# Patient Record
Sex: MALE | Race: BLACK OR AFRICAN AMERICAN | NOT HISPANIC OR LATINO | Employment: FULL TIME | URBAN - METROPOLITAN AREA
[De-identification: names, ages, dates, MRNs, and addresses within clinical notes are randomized per-mention and may not be internally consistent; named-entity substitution may affect disease eponyms.]

---

## 2020-12-23 ENCOUNTER — OFFICE VISIT (OUTPATIENT)
Dept: FAMILY MEDICINE CLINIC | Facility: CLINIC | Age: 25
End: 2020-12-23

## 2020-12-23 VITALS
DIASTOLIC BLOOD PRESSURE: 68 MMHG | WEIGHT: 192 LBS | BODY MASS INDEX: 28.44 KG/M2 | SYSTOLIC BLOOD PRESSURE: 94 MMHG | TEMPERATURE: 97.8 F | HEIGHT: 69 IN | OXYGEN SATURATION: 98 % | HEART RATE: 64 BPM

## 2020-12-23 DIAGNOSIS — M25.472 BILATERAL SWELLING OF FEET AND ANKLES: ICD-10-CM

## 2020-12-23 DIAGNOSIS — M25.471 BILATERAL SWELLING OF FEET AND ANKLES: ICD-10-CM

## 2020-12-23 DIAGNOSIS — M25.474 BILATERAL SWELLING OF FEET AND ANKLES: ICD-10-CM

## 2020-12-23 DIAGNOSIS — M25.561 RIGHT KNEE PAIN, UNSPECIFIED CHRONICITY: ICD-10-CM

## 2020-12-23 DIAGNOSIS — Z76.89 ENCOUNTER TO ESTABLISH CARE: Primary | ICD-10-CM

## 2020-12-23 DIAGNOSIS — M25.475 BILATERAL SWELLING OF FEET AND ANKLES: ICD-10-CM

## 2020-12-23 LAB
ALBUMIN SERPL-MCNC: 4.9 G/DL (ref 3.5–5.2)
ALBUMIN/GLOB SERPL: 2 G/DL
ALP SERPL-CCNC: 91 U/L (ref 39–117)
ALT SERPL W P-5'-P-CCNC: 22 U/L (ref 1–41)
ANION GAP SERPL CALCULATED.3IONS-SCNC: 13.7 MMOL/L (ref 5–15)
AST SERPL-CCNC: 28 U/L (ref 1–40)
BILIRUB SERPL-MCNC: 0.5 MG/DL (ref 0–1.2)
BUN SERPL-MCNC: 11 MG/DL (ref 6–20)
BUN/CREAT SERPL: 11.1 (ref 7–25)
CALCIUM SPEC-SCNC: 9.9 MG/DL (ref 8.6–10.5)
CHLORIDE SERPL-SCNC: 100 MMOL/L (ref 98–107)
CO2 SERPL-SCNC: 26.3 MMOL/L (ref 22–29)
CREAT SERPL-MCNC: 0.99 MG/DL (ref 0.76–1.27)
ERYTHROCYTE [DISTWIDTH] IN BLOOD BY AUTOMATED COUNT: 14.6 % (ref 12.3–15.4)
GFR SERPL CREATININE-BSD FRML MDRD: 111 ML/MIN/1.73
GLOBULIN UR ELPH-MCNC: 2.5 GM/DL
GLUCOSE SERPL-MCNC: 94 MG/DL (ref 65–99)
HCT VFR BLD AUTO: 46 % (ref 37.5–51)
HGB BLD-MCNC: 14.2 G/DL (ref 13–17.7)
LYMPHOCYTES # BLD AUTO: 2.6 10*3/MM3 (ref 0.7–3.1)
LYMPHOCYTES NFR BLD AUTO: 44.3 % (ref 19.6–45.3)
MCH RBC QN AUTO: 23.4 PG (ref 26.6–33)
MCHC RBC AUTO-ENTMCNC: 30.9 G/DL (ref 31.5–35.7)
MCV RBC AUTO: 75.6 FL (ref 79–97)
MONOCYTES # BLD AUTO: 0.4 10*3/MM3 (ref 0.1–0.9)
MONOCYTES NFR BLD AUTO: 7 % (ref 5–12)
NEUTROPHILS NFR BLD AUTO: 2.9 10*3/MM3 (ref 1.7–7)
NEUTROPHILS NFR BLD AUTO: 48.7 % (ref 42.7–76)
PLATELET # BLD AUTO: 197 10*3/MM3 (ref 140–450)
PMV BLD AUTO: 8.5 FL (ref 6–12)
POTASSIUM SERPL-SCNC: 3.8 MMOL/L (ref 3.5–5.2)
PROT SERPL-MCNC: 7.4 G/DL (ref 6–8.5)
RBC # BLD AUTO: 6.08 10*6/MM3 (ref 4.14–5.8)
SODIUM SERPL-SCNC: 140 MMOL/L (ref 136–145)
TSH SERPL DL<=0.05 MIU/L-ACNC: 5.26 UIU/ML (ref 0.27–4.2)
WBC # BLD AUTO: 5.9 10*3/MM3 (ref 3.4–10.8)

## 2020-12-23 PROCEDURE — 36415 COLL VENOUS BLD VENIPUNCTURE: CPT | Performed by: NURSE PRACTITIONER

## 2020-12-23 PROCEDURE — 85025 COMPLETE CBC W/AUTO DIFF WBC: CPT | Performed by: NURSE PRACTITIONER

## 2020-12-23 PROCEDURE — 99203 OFFICE O/P NEW LOW 30 MIN: CPT | Performed by: NURSE PRACTITIONER

## 2020-12-23 PROCEDURE — 80053 COMPREHEN METABOLIC PANEL: CPT | Performed by: NURSE PRACTITIONER

## 2020-12-23 PROCEDURE — 84443 ASSAY THYROID STIM HORMONE: CPT | Performed by: NURSE PRACTITIONER

## 2020-12-23 NOTE — PROGRESS NOTES
Renetta Coronado is a 25 y.o. male.     History of Present Illness   Here today to establish care, no recent pcp, he works at [employer], he is single, no children, does stay active, states diet is good. Denies smoking. He does speak english and Canadian, we used a  via phonein office and via amanda during the visit today.   C/o right knee pain, he states symptoms started about 2-3 months ago, he states worsening with new work, he states foot swells at times, mostly with walking or standing more, occasionally in the left foot. Denies knee swelling, but does notice in bilat ankles and feet. States he thinks symptoms started about 2 years ago but worse now that he is standing all day at work. He does have ortho appt in January.       The following portions of the patient's history were reviewed and updated as appropriate: allergies, current medications, past family history, past medical history, past social history, past surgical history and problem list.    Review of Systems   Constitutional: Negative for chills, diaphoresis and fever.   Respiratory: Negative for cough and shortness of breath.    Cardiovascular: Negative for chest pain.   Musculoskeletal: Negative for arthralgias and myalgias.   Neurological: Negative for dizziness, light-headedness and headache.   All other systems reviewed and are negative.      Objective   Physical Exam  Vitals signs and nursing note reviewed.   Constitutional:       Appearance: He is well-developed.   HENT:      Head: Normocephalic.   Eyes:      Pupils: Pupils are equal, round, and reactive to light.   Cardiovascular:      Rate and Rhythm: Normal rate and regular rhythm.      Pulses:           Radial pulses are 2+ on the right side and 2+ on the left side.        Dorsalis pedis pulses are 2+ on the right side and 2+ on the left side.        Posterior tibial pulses are 2+ on the right side and 2+ on the left side.      Heart sounds: Normal heart sounds.       Comments: Mild bilat ankle swelling, non pitting R>L  Pulmonary:      Effort: Pulmonary effort is normal.      Breath sounds: Normal breath sounds.   Musculoskeletal:      Right knee: He exhibits normal range of motion and no swelling. No tenderness found.   Skin:     General: Skin is warm and dry.   Neurological:      Mental Status: He is alert and oriented to person, place, and time.   Psychiatric:         Behavior: Behavior normal.         Judgment: Judgment normal.           Assessment/Plan   Diagnoses and all orders for this visit:    1. Encounter to establish care (Primary)  -     Comprehensive Metabolic Panel  -     CBC & Differential  -     TSH  -     CBC Auto Differential    2. Right knee pain, unspecified chronicity  -     Comprehensive Metabolic Panel  -     CBC & Differential  -     TSH  -     CBC Auto Differential    3. Bilateral swelling of feet and ankles  -     Comprehensive Metabolic Panel  -     CBC & Differential  -     TSH  -     CBC Auto Differential      Labs today will call with results.   Encouraged rest, ice, elevation, compression stockings, good shoes, may try knee brace OTC.   If symptoms persist call office.   Cont f/u with ortho as scheduled.   Low salt diet.   Patient agrees with plan of care and understands instructions. Call if worsening symptoms or any problems or concerns.

## 2020-12-23 NOTE — PATIENT INSTRUCTIONS
Labs today will call with results.   Encouraged rest, ice, elevation, compression stockings, good shoes, may try knee brace OTC.   If symptoms persist call office.   Low salt diet.   Patient agrees with plan of care and understands instructions. Call if worsening symptoms or any problems or concerns.

## 2020-12-24 DIAGNOSIS — R79.89 ELEVATED TSH: Primary | ICD-10-CM

## 2021-01-15 ENCOUNTER — OFFICE VISIT (OUTPATIENT)
Dept: ORTHOPEDIC SURGERY | Facility: CLINIC | Age: 26
End: 2021-01-15

## 2021-01-15 VITALS — TEMPERATURE: 97.2 F | WEIGHT: 192 LBS | BODY MASS INDEX: 28.44 KG/M2 | HEIGHT: 69 IN

## 2021-01-15 DIAGNOSIS — G89.29 CHRONIC PAIN OF RIGHT KNEE: Primary | ICD-10-CM

## 2021-01-15 DIAGNOSIS — M25.561 CHRONIC PAIN OF RIGHT KNEE: Primary | ICD-10-CM

## 2021-01-15 PROCEDURE — 99203 OFFICE O/P NEW LOW 30 MIN: CPT | Performed by: ORTHOPAEDIC SURGERY

## 2021-01-18 NOTE — PROGRESS NOTES
Patient: Gema Coronado    YOB: 1995    Medical Record Number: [id number]    Chief Complaints: Right knee and leg pain    History of Present Illness:     26 y.o. male patient who presents for his right knee.  He does not speak English.  A Irish  has accompanied him today.  He reports a roughly 6-week history of worsening anterior knee pain.  Pain is described as moderate, constant and burning.  Pain is roughly 5 out of 10 at present.  He reports associated swelling and stiffness.  Symptoms are worse with standing, walking, sitting and climbing stairs.  He has tried physical therapy but it has not helped.  He reports that his symptoms are getting worse.  He tells me it periodically feels like the knee locks up.  He does report catching, clicking and popping as well.    Allergies: No Known Allergies    Home Medications:    No current outpatient medications on file.    History reviewed. No pertinent past medical history.    History reviewed. No pertinent surgical history.    Social History     Occupational History   • Not on file   Tobacco Use   • Smoking status: Never Smoker   Substance and Sexual Activity   • Alcohol use: Yes     Comment: occasional   • Drug use: Defer   • Sexual activity: Not on file      Social History     Social History Narrative   • Not on file       Family History   Problem Relation Age of Onset   • No Known Problems Mother    • No Known Problems Father        Review of Systems:      Constitutional: Denies fever, shaking or chills   Eyes: Denies change in visual acuity   HEENT: Denies nasal congestion or sore throat   Respiratory: Denies cough or shortness of breath   Cardiovascular: Denies chest pain or edema  Endocrine: Denies tremors, palpitations, intolerance of heat or cold, polyuria, polydipsia.  GI: Denies abdominal pain, nausea, vomiting, bloody stools or diarrhea  : Denies frequency, urgency, incontinence, retention, or nocturia.  Musculoskeletal:  "Denies numbness, tingling or loss of motor function except as above  Integument: Denies rash, lesion or ulceration   Neurologic: Denies headache or focal weakness, deficits  Heme: Denies spontaneous or excessive bleeding, epistaxis, hematuria, melena, fatigue, enlarged or tender lymph nodes.      All other pertinent positives and negatives as noted above in HPI.    Physical Exam: 26 y.o. male    Vitals:    01/15/21 1429   Temp: 97.2 °F (36.2 °C)   Weight: 87.1 kg (192 lb)   Height: 175.3 cm (69\")     General:  Patient is awake and alert.  Appears in no acute distress or discomfort.    Psych:  Affect and demeanor are appropriate.    Eyes:  Conjunctiva and sclera appear grossly normal.  Eyes track well and EOM seem to be intact.    Ears:  No gross abnormalities.  Hearing adequate for the exam.    Cardiovascular:  Regular rate and rhythm.    Lungs:  Good chest expansion.  Breathing unlabored.    Lymph:  No palpable masses or adenopathy in the affected extremity    Extremities: Right knee and leg are examined.  Skin is benign.  He has some edema in his right ankle which is 1+ and pitting.  He has similar edema in the other leg.  He has anterior tenderness over the lateral patellofemoral retinaculum.  No crepitus.  No effusion.  He has good knee motion.  No instability.  Negative medial and lateral Antoni's test.  Normal motor and sensory function throughout the leg and foot.  Palpable pedal pulse.  Brisk capillary refill.         Radiology:   Outside x-rays including AP, 45 degree PA flexion and lateral views of the right knee are reviewed.  No comparison films are available.  No abnormalities noted on the x-rays.    Assessment/Plan: 1.  Bilateral pedal edema   2.  Persistent anterior right knee pain of unclear etiology    He has pedal edema bilaterally.  I told him this is unusual for such a young person in my experience.  I consider this is most likely a medical issue for which I recommend he talk with his PCP.  He " did have lab work recently which showed an elevated TSH.  I suggested he talk with his PCP about whether or not this could be related.  If they do not think this is a medical issue, then I recommend we get him to vascular to have this evaluated.  Regardless, I do not think it is related to his anterior knee pain.  He is going to reach back out to his PCP about this.      His knee exam is fairly benign.  I do not have a good explanation for him.  We discussed that and he would like to pursue further work-up.  I have agreed to refer him for an MRI.  I told him I will call him with the results.    Patrick Brock MD    01/15/2021    CC to Jacinda Bellamy, MARCY

## 2021-02-01 ENCOUNTER — OFFICE VISIT (OUTPATIENT)
Dept: FAMILY MEDICINE CLINIC | Facility: CLINIC | Age: 26
End: 2021-02-01

## 2021-02-01 VITALS
SYSTOLIC BLOOD PRESSURE: 102 MMHG | WEIGHT: 191 LBS | HEIGHT: 69 IN | BODY MASS INDEX: 28.29 KG/M2 | RESPIRATION RATE: 20 BRPM | TEMPERATURE: 96.8 F | HEART RATE: 88 BPM | DIASTOLIC BLOOD PRESSURE: 64 MMHG | OXYGEN SATURATION: 97 %

## 2021-02-01 DIAGNOSIS — M25.475 BILATERAL SWELLING OF FEET AND ANKLES: ICD-10-CM

## 2021-02-01 DIAGNOSIS — R79.89 ELEVATED TSH: ICD-10-CM

## 2021-02-01 DIAGNOSIS — M25.474 BILATERAL SWELLING OF FEET AND ANKLES: ICD-10-CM

## 2021-02-01 DIAGNOSIS — M25.561 RIGHT KNEE PAIN, UNSPECIFIED CHRONICITY: Primary | ICD-10-CM

## 2021-02-01 DIAGNOSIS — R79.89 TSH ELEVATION: ICD-10-CM

## 2021-02-01 DIAGNOSIS — J30.9 ALLERGIC RHINITIS, UNSPECIFIED SEASONALITY, UNSPECIFIED TRIGGER: ICD-10-CM

## 2021-02-01 DIAGNOSIS — M25.472 BILATERAL SWELLING OF FEET AND ANKLES: ICD-10-CM

## 2021-02-01 DIAGNOSIS — M25.471 BILATERAL SWELLING OF FEET AND ANKLES: ICD-10-CM

## 2021-02-01 LAB
T-UPTAKE NFR SERPL: 1.09 TBI (ref 0.8–1.3)
T4 SERPL-MCNC: 5.82 MCG/DL (ref 4.5–11.7)
TSH SERPL DL<=0.05 MIU/L-ACNC: 2.09 UIU/ML (ref 0.27–4.2)

## 2021-02-01 PROCEDURE — 84479 ASSAY OF THYROID (T3 OR T4): CPT | Performed by: NURSE PRACTITIONER

## 2021-02-01 PROCEDURE — 84436 ASSAY OF TOTAL THYROXINE: CPT | Performed by: NURSE PRACTITIONER

## 2021-02-01 PROCEDURE — 84443 ASSAY THYROID STIM HORMONE: CPT | Performed by: NURSE PRACTITIONER

## 2021-02-01 PROCEDURE — 36415 COLL VENOUS BLD VENIPUNCTURE: CPT | Performed by: NURSE PRACTITIONER

## 2021-02-01 PROCEDURE — 99214 OFFICE O/P EST MOD 30 MIN: CPT | Performed by: NURSE PRACTITIONER

## 2021-02-01 RX ORDER — FLUTICASONE PROPIONATE 50 MCG
2 SPRAY, SUSPENSION (ML) NASAL DAILY
Qty: 18 G | Refills: 3 | Status: SHIPPED | OUTPATIENT
Start: 2021-02-01 | End: 2022-12-09

## 2021-02-01 NOTE — PATIENT INSTRUCTIONS
Cont f/u with ortho and MRI as scheduled.   Refer to vascular will call with appt.   Recheck thyroid lab today, will call with results.   Cont compression, low salt diet, leg elevation.   Patient agrees with plan of care and understands instructions. Call if worsening symptoms or any problems or concerns.

## 2021-02-01 NOTE — PROGRESS NOTES
"Chief Complaint  lab resulta (discuss lab results) and pain right knee (no known injury)    Renetta Currie presents to [facility] for   History of Present Illness  Here today for f/u, c/o right knee pain, he was seen in office 12/23/2020, referred to ortho and saw ortho for knee pain 1/15/2021. He has knee MRI scheduled for 2/11/2021, with bilat ankle swelling, ortho recommended vascular consult if no medical reason for this, states knee pain has improved worse if he works longer, states PT did help. He does wear compression stockings. States swelling has not improved. BP WNL.   Last labs 12/23/2020 tsh elevated due for recheck today.   He also c/o PND, states noticed about 2 years ago, he has drainage, coughs up sputum at times.       Objective   Vital Signs:   /64 (BP Location: Left arm, Patient Position: Sitting)   Pulse 88   Temp 96.8 °F (36 °C) (Infrared)   Resp 20   Ht 175.3 cm (69\")   Wt 86.6 kg (191 lb)   SpO2 97%   BMI 28.21 kg/m²     Physical Exam  Vitals signs and nursing note reviewed.   Constitutional:       Appearance: He is well-developed.   HENT:      Head: Normocephalic.   Eyes:      Pupils: Pupils are equal, round, and reactive to light.   Neck:      Musculoskeletal: Neck supple.      Thyroid: No thyromegaly.   Cardiovascular:      Rate and Rhythm: Normal rate and regular rhythm.      Heart sounds: Normal heart sounds.   Pulmonary:      Effort: Pulmonary effort is normal.      Breath sounds: Normal breath sounds.   Feet:      Comments: Mild bilat non pitting edema ankles and feet.   Skin:     General: Skin is warm and dry.   Neurological:      Mental Status: He is alert and oriented to person, place, and time.   Psychiatric:         Behavior: Behavior normal.         Judgment: Judgment normal.        Result Review :                 Assessment and Plan    Problem List Items Addressed This Visit     None      Visit " Diagnoses     Right knee pain, unspecified chronicity    -  Primary    TSH elevation        Bilateral swelling of feet and ankles        Relevant Orders    Ambulatory Referral to Vascular Surgery    Allergic rhinitis, unspecified seasonality, unspecified trigger        Elevated TSH              Follow Up   Return if symptoms worsen or fail to improve.  Patient was given instructions and counseling regarding his condition or for health maintenance advice. Please see specific information pulled into the AVS if appropriate.     Cont f/u with ortho and MRI as scheduled.   Refer to vascular will call with appt.   Recheck thyroid lab today, will call with results.   Cont compression, low salt diet, leg elevation.   Trial flonase 2 sprays each nostril daily as needed.   If symptoms persist call office.   Patient agrees with plan of care and understand instructions. Call if worsening symptoms or any problems or concerns.

## 2021-02-11 ENCOUNTER — HOSPITAL ENCOUNTER (OUTPATIENT)
Dept: MRI IMAGING | Facility: HOSPITAL | Age: 26
Discharge: HOME | End: 2021-02-11
Admitting: ORTHOPAEDIC SURGERY

## 2021-02-11 DIAGNOSIS — G89.29 CHRONIC PAIN OF RIGHT KNEE: ICD-10-CM

## 2021-02-11 DIAGNOSIS — M25.561 CHRONIC PAIN OF RIGHT KNEE: ICD-10-CM

## 2021-02-11 PROCEDURE — 73721 MRI JNT OF LWR EXTRE W/O DYE: CPT

## 2021-04-16 ENCOUNTER — BULK ORDERING (OUTPATIENT)
Dept: CASE MANAGEMENT | Facility: OTHER | Age: 26
End: 2021-04-16

## 2021-04-16 DIAGNOSIS — Z23 IMMUNIZATION DUE: ICD-10-CM

## 2021-09-16 ENCOUNTER — PATIENT MESSAGE (OUTPATIENT)
Dept: FAMILY MEDICINE CLINIC | Facility: CLINIC | Age: 26
End: 2021-09-16

## 2021-09-16 ENCOUNTER — LAB (OUTPATIENT)
Dept: FAMILY MEDICINE CLINIC | Facility: CLINIC | Age: 26
End: 2021-09-16

## 2021-09-16 ENCOUNTER — TELEPHONE (OUTPATIENT)
Dept: FAMILY MEDICINE CLINIC | Facility: CLINIC | Age: 26
End: 2021-09-16

## 2021-09-16 DIAGNOSIS — Z20.822 EXPOSURE TO COVID-19 VIRUS: ICD-10-CM

## 2021-09-16 DIAGNOSIS — R51.9 NONINTRACTABLE HEADACHE, UNSPECIFIED CHRONICITY PATTERN, UNSPECIFIED HEADACHE TYPE: ICD-10-CM

## 2021-09-16 DIAGNOSIS — Z20.822 EXPOSURE TO COVID-19 VIRUS: Primary | ICD-10-CM

## 2021-09-16 DIAGNOSIS — J02.9 SORE THROAT: ICD-10-CM

## 2021-09-16 NOTE — TELEPHONE ENCOUNTER
Mychart, Generic 9/16/2021 2:30 PM EDT    Yesterday   ----- Message -----  From: MALINDA DAILEY  Sent: 9/16/21, 2:13 PM  To: Gema Currie  Subject: RE: Test Results Question    What is the first day you have missed for your note?      ----- Message -----   From:Gema Currie   Sent:9/16/2021 2:05 PM EDT   To:MARCY Valdez   Subject:RE: Test Results Question    I called your office's number for coming at 2:30, she told me that you will call me back       ----- Message -----   From:MARCY Smart   Sent:9/16/2021 1:27 PM EDT   To:Gema Currie   Subject:RE: Test Results Question    You can come by the clinic and wait in the parking lot and we can come down and swab you. You should not have to pay, please call and let us know when you will be here before 4 PM      ----- Message -----   From:Gema Currie   Sent:9/16/2021 1:02 PM EDT   To:MARCY Valdez   Subject:RE: Test Results Question    I was with my brother and sister on Saturday and it was on the evening that they tested positive, which is why I too would like to do the test if possible. Is the test paid for or is it free? If it pays, how much is it? And when can I do it ?      ----- Message -----   From:MARCY Smart   Sent:9/16/2021 12:57 PM EDT   To:Gema Currie   Subject:RE: Test Results Question    What day were you exposed? We can test you in the parking lot if you would like      ----- Message -----   From:Gema Currie   Sent:9/16/2021 12:35 AM EDT   To:MARCY Valdez   Subject:Test Results Question    Good evening Doctor,  I am fully vaccinated but I have been exposed to people who have tested positive and I feel very tired and some trouble with my throat, so I do not know if these could be symptoms that is why I am contacting you and if possible to take my test

## 2021-09-16 NOTE — TELEPHONE ENCOUNTER
UNABLE TO WARM TRANSFER    Caller: Gema Currie    Relationship to patient: Self    Best call back number: [phone]    Patient is needing: PATIENT IS NEEDING TO SCHEDULE A COVID TEST. PLEASE CALL. HE WILL NEED A WORK NOTE ALSO.

## 2021-09-17 LAB
LABCORP SARS-COV-2, NAA 2 DAY TAT: NORMAL
SARS-COV-2 RNA RESP QL NAA+PROBE: NOT DETECTED

## 2022-12-09 ENCOUNTER — OFFICE VISIT (OUTPATIENT)
Dept: FAMILY MEDICINE CLINIC | Facility: CLINIC | Age: 27
End: 2022-12-09

## 2022-12-09 VITALS
OXYGEN SATURATION: 98 % | BODY MASS INDEX: 30.72 KG/M2 | DIASTOLIC BLOOD PRESSURE: 72 MMHG | SYSTOLIC BLOOD PRESSURE: 114 MMHG | HEART RATE: 73 BPM | HEIGHT: 69 IN | WEIGHT: 207.4 LBS | TEMPERATURE: 98.4 F

## 2022-12-09 DIAGNOSIS — Z76.89 ENCOUNTER TO ESTABLISH CARE WITH NEW DOCTOR: ICD-10-CM

## 2022-12-09 DIAGNOSIS — Z00.00 ENCOUNTER FOR ANNUAL PHYSICAL EXAM: Primary | ICD-10-CM

## 2022-12-09 DIAGNOSIS — R60.9 TRACE EDEMA: ICD-10-CM

## 2022-12-09 PROCEDURE — 99395 PREV VISIT EST AGE 18-39: CPT | Performed by: NURSE PRACTITIONER

## 2022-12-09 NOTE — PATIENT INSTRUCTIONS
Recommend limiting salt in diet, use of compression socks during day and get a good fitting shoe. Will notify of lab results and when to follow up.

## 2022-12-09 NOTE — PROGRESS NOTES
"Chief Complaint  Establish Care (Pt not fasting) and Foot Swelling (bilateral)    Subjective    {Problem List  Visit Diagnosis   Encounters  Notes  Medications  Labs  Result Review Imaging  Media :23}    Gema Currie presents to [facility]  History of Present Illness   27-year-old male former Jacinda Bellamy patient, new to me, presenting to establish [facility] and annual exam, fasting today for labs, single, lives alone, does not smoke, exercises occasionally.  Denies any known medical history, no current medication regimen.  He does report intermittent edema bilateral feet, denies CP, heart palpitations, SOA, blood pressure well controlled without medication at 114/72, denies family history of heart disease, he does appear to have on tight fitting shoes, recommended proper footwear, limit salt intake and follow-up if symptoms not improved.    Objective   Vital Signs:  /72 (BP Location: Left arm, Patient Position: Sitting, Cuff Size: Large Adult)   Pulse 73   Temp 98.4 °F (36.9 °C) (Infrared)   Ht 175.3 cm (69\")   Wt 94.1 kg (207 lb 6.4 oz)   SpO2 98%   BMI 30.63 kg/m²   Estimated body mass index is 30.63 kg/m² as calculated from the following:    Height as of this encounter: 175.3 cm (69\").    Weight as of this encounter: 94.1 kg (207 lb 6.4 oz).    BMI is >= 30 and <35. (Class 1 Obesity). The following options were offered after discussion;: weight loss educational material (shared in after visit summary)      Physical Exam  HENT:      Head: Normocephalic.   Eyes:      Pupils: Pupils are equal, round, and reactive to light.   Cardiovascular:      Rate and Rhythm: Normal rate.      Pulses: Normal pulses.      Heart sounds: Normal heart sounds.      Comments: Trace edema bilateral feet  Pulmonary:      Effort: Pulmonary effort is normal.      Breath sounds: Normal breath sounds.   Abdominal:      General: Bowel sounds are normal.      Palpations: Abdomen is soft. "   Musculoskeletal:         General: Normal range of motion.      Cervical back: Normal range of motion.   Skin:     General: Skin is warm.   Neurological:      General: No focal deficit present.      Mental Status: He is alert and oriented to person, place, and time.   Psychiatric:         Mood and Affect: Mood normal.         Behavior: Behavior normal.         Thought Content: Thought content normal.         Judgment: Judgment normal.        Result Review :                Assessment and Plan   Diagnoses and all orders for this visit:    1. Encounter for annual physical exam (Primary)  -     CBC & Differential  -     Comprehensive Metabolic Panel  -     Lipid Panel  -     TSH    2. Encounter to establish care with new doctor    3. Trace edema  Comments:  Limit salt intake, recommended proper footwear, follow-up if symptoms not improved.      Counseling was provided on nutrition, physical activity, injury prevention, eye and dental health, patient verbalizes understanding no additional questions were asked.       Follow Up   Return if symptoms worsen or fail to improve.  Patient was given instructions and counseling regarding his condition or for health maintenance advice. Please see specific information pulled into the AVS if appropriate.     Recommend limiting salt in diet, use of compression socks during day and get a good fitting shoe. Will notify of lab results and when to follow up.     Mask and gloves worn

## 2022-12-10 LAB
ALBUMIN SERPL-MCNC: 4.7 G/DL (ref 4.1–5.2)
ALBUMIN/GLOB SERPL: 1.9 {RATIO} (ref 1.2–2.2)
ALP SERPL-CCNC: 97 IU/L (ref 44–121)
ALT SERPL-CCNC: 22 IU/L (ref 0–44)
AST SERPL-CCNC: 27 IU/L (ref 0–40)
BASOPHILS # BLD AUTO: 0 X10E3/UL (ref 0–0.2)
BASOPHILS NFR BLD AUTO: 1 %
BILIRUB SERPL-MCNC: 0.2 MG/DL (ref 0–1.2)
BUN SERPL-MCNC: 7 MG/DL (ref 6–20)
BUN/CREAT SERPL: 7 (ref 9–20)
CALCIUM SERPL-MCNC: 9.6 MG/DL (ref 8.7–10.2)
CHLORIDE SERPL-SCNC: 103 MMOL/L (ref 96–106)
CHOLEST SERPL-MCNC: 188 MG/DL (ref 100–199)
CO2 SERPL-SCNC: 24 MMOL/L (ref 20–29)
CREAT SERPL-MCNC: 1.04 MG/DL (ref 0.76–1.27)
EGFRCR SERPLBLD CKD-EPI 2021: 100 ML/MIN/1.73
EOSINOPHIL # BLD AUTO: 0.1 X10E3/UL (ref 0–0.4)
EOSINOPHIL NFR BLD AUTO: 2 %
ERYTHROCYTE [DISTWIDTH] IN BLOOD BY AUTOMATED COUNT: 13.8 % (ref 11.6–15.4)
GLOBULIN SER CALC-MCNC: 2.5 G/DL (ref 1.5–4.5)
GLUCOSE SERPL-MCNC: 99 MG/DL (ref 70–99)
HCT VFR BLD AUTO: 46.4 % (ref 37.5–51)
HDLC SERPL-MCNC: 33 MG/DL
HGB BLD-MCNC: 14.3 G/DL (ref 13–17.7)
IMM GRANULOCYTES # BLD AUTO: 0 X10E3/UL (ref 0–0.1)
IMM GRANULOCYTES NFR BLD AUTO: 0 %
LDLC SERPL CALC-MCNC: 135 MG/DL (ref 0–99)
LYMPHOCYTES # BLD AUTO: 1.7 X10E3/UL (ref 0.7–3.1)
LYMPHOCYTES NFR BLD AUTO: 33 %
MCH RBC QN AUTO: 23.6 PG (ref 26.6–33)
MCHC RBC AUTO-ENTMCNC: 30.8 G/DL (ref 31.5–35.7)
MCV RBC AUTO: 76 FL (ref 79–97)
MONOCYTES # BLD AUTO: 0.6 X10E3/UL (ref 0.1–0.9)
MONOCYTES NFR BLD AUTO: 11 %
NEUTROPHILS # BLD AUTO: 2.7 X10E3/UL (ref 1.4–7)
NEUTROPHILS NFR BLD AUTO: 53 %
PLATELET # BLD AUTO: 202 X10E3/UL (ref 150–450)
POTASSIUM SERPL-SCNC: 4.4 MMOL/L (ref 3.5–5.2)
PROT SERPL-MCNC: 7.2 G/DL (ref 6–8.5)
RBC # BLD AUTO: 6.07 X10E6/UL (ref 4.14–5.8)
SODIUM SERPL-SCNC: 142 MMOL/L (ref 134–144)
TRIGL SERPL-MCNC: 107 MG/DL (ref 0–149)
TSH SERPL DL<=0.005 MIU/L-ACNC: 2.66 UIU/ML (ref 0.45–4.5)
VLDLC SERPL CALC-MCNC: 20 MG/DL (ref 5–40)
WBC # BLD AUTO: 5.1 X10E3/UL (ref 3.4–10.8)

## 2022-12-15 DIAGNOSIS — Z13.0 SCREENING FOR IRON DEFICIENCY ANEMIA: Primary | ICD-10-CM

## 2023-01-27 ENCOUNTER — OFFICE VISIT (OUTPATIENT)
Dept: FAMILY MEDICINE CLINIC | Facility: CLINIC | Age: 28
End: 2023-01-27
Payer: COMMERCIAL

## 2023-01-27 VITALS
BODY MASS INDEX: 30.42 KG/M2 | SYSTOLIC BLOOD PRESSURE: 132 MMHG | TEMPERATURE: 97.3 F | HEART RATE: 86 BPM | WEIGHT: 205.4 LBS | OXYGEN SATURATION: 97 % | HEIGHT: 69 IN | DIASTOLIC BLOOD PRESSURE: 74 MMHG

## 2023-01-27 DIAGNOSIS — N53.19 DISORDER OF EJACULATION: ICD-10-CM

## 2023-01-27 DIAGNOSIS — Z83.3 FAMILY HISTORY OF DIABETES MELLITUS: ICD-10-CM

## 2023-01-27 DIAGNOSIS — Z72.51 UNPROTECTED SEXUAL INTERCOURSE: ICD-10-CM

## 2023-01-27 DIAGNOSIS — R14.3 EXCESSIVE FLATUS: ICD-10-CM

## 2023-01-27 DIAGNOSIS — N52.9 ERECTILE DYSFUNCTION, UNSPECIFIED ERECTILE DYSFUNCTION TYPE: Primary | ICD-10-CM

## 2023-01-27 PROCEDURE — 99214 OFFICE O/P EST MOD 30 MIN: CPT | Performed by: NURSE PRACTITIONER

## 2023-01-27 RX ORDER — SIMETHICONE 80 MG
80 TABLET,CHEWABLE ORAL EVERY 6 HOURS PRN
Qty: 20 TABLET | Refills: 3 | Status: SHIPPED | OUTPATIENT
Start: 2023-01-27

## 2023-01-27 NOTE — PATIENT INSTRUCTIONS
Informera tash résultats de laboratoire. Augmenter les fibres dans l'alimentation. Tenez un journal tash aliments angie causent tash ballonnements et/ou un excès de gaz.

## 2023-01-27 NOTE — PROGRESS NOTES
"Chief Complaint  Erectile Dysfunction, Abdominal Pain, and Gas    Renetta        Gradi Kush presents to [facility]  History of Present Illness   28-year-old male presenting with complaints of erectile dysfunction, states he has had issues recently with ejaculation and sustaining erection, reports same partner for 1 year but first sexual encounter, denies past sexual intercourse dysfunction, will check testosterone level, he is also requesting STD testing, I will also rule out diabetes as he has a family history.  He also reports episodes of excessive flatus and intermittent constipation, no abdominal pain or tenderness noted on palpitation, bowel sounds active in all 4 quadrants, recommended he keep a diary of episodes and foods consumed that day, encouraged increasing fiber in diet and water intake, he is to follow-up in 2 weeks if symptoms not resolved.     Communication with Ukrainian  via iPad    Objective   Vital Signs:  /74 (BP Location: Left arm, Patient Position: Sitting, Cuff Size: Large Adult)   Pulse 86   Temp 97.3 °F (36.3 °C) (Infrared)   Ht 175.3 cm (69\")   Wt 93.2 kg (205 lb 6.4 oz)   SpO2 97%   BMI 30.33 kg/m²   Estimated body mass index is 30.33 kg/m² as calculated from the following:    Height as of this encounter: 175.3 cm (69\").    Weight as of this encounter: 93.2 kg (205 lb 6.4 oz).             Physical Exam  Cardiovascular:      Rate and Rhythm: Normal rate.      Pulses: Normal pulses.      Heart sounds: Normal heart sounds.   Pulmonary:      Effort: Pulmonary effort is normal.      Breath sounds: Normal breath sounds.   Abdominal:      General: Bowel sounds are normal. There is no distension.      Palpations: Abdomen is soft.      Tenderness: There is no abdominal tenderness. There is no guarding or rebound.   Neurological:      General: No focal deficit present.      Mental Status: He is alert and oriented to person, place, and " time.   Psychiatric:         Mood and Affect: Mood normal.         Behavior: Behavior normal.        Result Review :                   Assessment and Plan   Diagnoses and all orders for this visit:    1. Erectile dysfunction, unspecified erectile dysfunction type (Primary)  -     Testosterone    2. Disorder of ejaculation  -     Testosterone  -     HIV-1 / O / 2 Ag / Antibody 4th Generation  -     HSV 1 Antibody, IgG  -     HSV 2 Antibody, IgG  -     RPR  -     Chlamydia trachomatis, Neisseria gonorrhoeae, PCR - Urine, Urine, Random Void    3. Unprotected sexual intercourse  -     HIV-1 / O / 2 Ag / Antibody 4th Generation  -     HSV 1 Antibody, IgG  -     HSV 2 Antibody, IgG  -     RPR  -     Chlamydia trachomatis, Neisseria gonorrhoeae, PCR - Urine, Urine, Random Void    4. Excessive flatus  -     simethicone (Gas-X) 80 MG chewable tablet; Chew 1 tablet Every 6 (Six) Hours As Needed for Flatulence.  Dispense: 20 tablet; Refill: 3    5. Family history of diabetes mellitus  -     Hemoglobin A1c             Follow Up   Return if symptoms worsen or fail to improve.  Patient was given instructions and counseling regarding his condition or for health maintenance advice. Please see specific information pulled into the AVS if appropriate.     Inform about lab results. Increase fiber in the diet. Keep a diary of foods that cause bloating and/or excess gas. Follow-up in 1-2 weeks if symptoms not improved.     Mask and gloves worn

## 2023-01-28 LAB
HBA1C MFR BLD: 7.6 % (ref 4.8–5.6)
HIV 1+2 AB+HIV1 P24 AG SERPL QL IA: NON REACTIVE
HSV1 IGG SER IA-ACNC: <0.91 INDEX (ref 0–0.9)
HSV2 IGG SER IA-ACNC: <0.91 INDEX (ref 0–0.9)
RPR SER QL: NON REACTIVE
TESTOST SERPL-MCNC: 449 NG/DL (ref 264–916)

## 2023-02-02 DIAGNOSIS — R14.3 EXCESSIVE FLATUS: ICD-10-CM

## 2023-02-02 RX ORDER — METFORMIN HYDROCHLORIDE 500 MG/1
500 TABLET, EXTENDED RELEASE ORAL
Qty: 90 TABLET | Refills: 0 | Status: SHIPPED | OUTPATIENT
Start: 2023-02-02

## 2023-02-02 RX ORDER — SIMETHICONE 80 MG
80 TABLET,CHEWABLE ORAL EVERY 6 HOURS PRN
Qty: 20 TABLET | Refills: 3 | OUTPATIENT
Start: 2023-02-02

## 2023-02-24 ENCOUNTER — OFFICE VISIT (OUTPATIENT)
Dept: FAMILY MEDICINE CLINIC | Facility: CLINIC | Age: 28
End: 2023-02-24
Payer: COMMERCIAL

## 2023-02-24 VITALS
HEIGHT: 69 IN | DIASTOLIC BLOOD PRESSURE: 72 MMHG | WEIGHT: 200.8 LBS | HEART RATE: 85 BPM | RESPIRATION RATE: 16 BRPM | SYSTOLIC BLOOD PRESSURE: 119 MMHG | OXYGEN SATURATION: 97 % | TEMPERATURE: 98.2 F | BODY MASS INDEX: 29.74 KG/M2

## 2023-02-24 DIAGNOSIS — Z79.4 TYPE 2 DIABETES MELLITUS WITHOUT COMPLICATION, WITH LONG-TERM CURRENT USE OF INSULIN: Primary | ICD-10-CM

## 2023-02-24 DIAGNOSIS — E11.9 TYPE 2 DIABETES MELLITUS WITHOUT COMPLICATION, WITH LONG-TERM CURRENT USE OF INSULIN: Primary | ICD-10-CM

## 2023-02-24 DIAGNOSIS — E78.00 ELEVATED LDL CHOLESTEROL LEVEL: ICD-10-CM

## 2023-02-24 DIAGNOSIS — Z76.89 ENCOUNTER TO ESTABLISH CARE: ICD-10-CM

## 2023-02-24 PROCEDURE — 99213 OFFICE O/P EST LOW 20 MIN: CPT | Performed by: FAMILY MEDICINE

## 2023-02-24 NOTE — ASSESSMENT & PLAN NOTE
Diabetes is new diagnosis.   Continue current treatment regimen.  discussion regarding proper diet, he will cut back on white rice   Brown rice in moderation.   Limit sugary sweets and drinks.   Increase exercise at least 30 min a day.   Diabetes will be reassessed in 3 months.

## 2023-02-24 NOTE — PROGRESS NOTES
"    Chief Complaint  Hyperlipidemia, Diabetes, and Establish Care ( (Eryn) [id number])    Subjective    History of Present Illness {CC  Problem List  Visit  Diagnosis   Encounters  Notes  Medications  Labs  Result Review Imaging  Media :23}     Gema Currie presents to [facility] for   History of Present Illness   29 yo male present to establish care. He is new to me, previously seeing Matilda VIDAL. He recently diagnosed with diabetes and started on metformin a few weeks ago.   He is tolerating medication well, no belly pain or diarrhea.    He is monitoring diet, cut back on alcohol.   Drinking more water.       Social History     Socioeconomic History   • Marital status: Single   Tobacco Use   • Smoking status: Never   Substance and Sexual Activity   • Alcohol use: Yes     Alcohol/week: 10.0 standard drinks     Types: 5 Glasses of wine, 5 Shots of liquor per week     Comment: occasional   • Drug use: Never   • Sexual activity: Yes     Partners: Female     Birth control/protection: Condom      Objective     Vital Signs:   /72 (BP Location: Right arm, Patient Position: Sitting, Cuff Size: Adult)   Pulse 85   Temp 98.2 °F (36.8 °C) (Infrared)   Resp 16   Ht 175.3 cm (69.02\")   Wt 91.1 kg (200 lb 12.8 oz)   SpO2 97%   BMI 29.64 kg/m²   Physical Exam  Constitutional:       General: He is not in acute distress.     Appearance: He is not ill-appearing.   HENT:      Head: Normocephalic.   Cardiovascular:      Rate and Rhythm: Regular rhythm.      Heart sounds: Normal heart sounds.   Pulmonary:      Effort: No respiratory distress.      Breath sounds: Normal breath sounds. No wheezing.   Musculoskeletal:      Right lower leg: No edema.      Left lower leg: No edema.   Neurological:      Mental Status: He is alert.        Result Review  Data Reviewed:{ Labs  Result Review  Imaging  Med Tab  Media :23}   The following data was reviewed by: " Tootie Roberson MD on 02/24/2023  Lab Results - Last 18 Months   Lab Units 01/27/23  1444 12/09/22  1138   BUN mg/dL  --  7   CREATININE mg/dL  --  1.04   SODIUM mmol/L  --  142   POTASSIUM mmol/L  --  4.4   CHLORIDE mmol/L  --  103   CALCIUM mg/dL  --  9.6   ALBUMIN g/dL  --  4.7   BILIRUBIN mg/dL  --  0.2   ALK PHOS IU/L  --  97   AST (SGOT) IU/L  --  27   ALT (SGPT) IU/L  --  22   CHOLESTEROL mg/dL  --  188   TRIGLYCERIDES mg/dL  --  107   HDL CHOL mg/dL  --  33*   VLDL CHOLESTEROL LUIS mg/dL  --  20   LDL CHOL mg/dL  --  135*   HEMOGLOBIN A1C % 7.6*  --    WBC x10E3/uL  --  5.1   RBC x10E6/uL  --  6.07*   HEMATOCRIT %  --  46.4   MCV fL  --  76*   MCH pg  --  23.6*   TSH uIU/mL  --  2.660              Current Outpatient Medications:   •  metFORMIN ER (GLUCOPHAGE-XR) 500 MG 24 hr tablet, Take 1 tablet by mouth Daily With Breakfast., Disp: 90 tablet, Rfl: 0  •  simethicone (Gas-X) 80 MG chewable tablet, Chew 1 tablet Every 6 (Six) Hours As Needed for Flatulence., Disp: 20 tablet, Rfl: 3      Assessment and Plan {CC Problem List  Visit Diagnosis  ROS  Review (Popup)  Health Maintenance  Quality  BestPractice  Medications  SmartSets  SnapShot Encounters  Media :23}   Problem List Items Addressed This Visit        Cardiac and Vasculature    Elevated LDL cholesterol level    Current Assessment & Plan     Increase exercise  High fiber diet  Recheck in 3 weeks.             Endocrine and Metabolic    Type 2 diabetes mellitus, with long-term current use of insulin (HCC) - Primary    Current Assessment & Plan     Diabetes is new diagnosis.   Continue current treatment regimen.  discussion regarding proper diet, he will cut back on white rice   Brown rice in moderation.   Limit sugary sweets and drinks.   Increase exercise at least 30 min a day.   Diabetes will be reassessed in 3 months.        Other Visit Diagnoses     Encounter to establish care            Fasting labs next visit.       Follow Up   Return in  about 3 months (around 5/24/2023).  Patient was given instructions and counseling regarding his condition or for health maintenance advice. Please see specific information pulled into the AVS if appropriate.    EpicAct:[email or web address],RunParams:STARTUPTYPE=FOLLOW    [email or web address]_DISCHARGE

## 2023-05-09 ENCOUNTER — TELEPHONE (OUTPATIENT)
Dept: FAMILY MEDICINE CLINIC | Facility: CLINIC | Age: 28
End: 2023-05-09
Payer: COMMERCIAL

## 2023-05-09 RX ORDER — METFORMIN HYDROCHLORIDE 500 MG/1
500 TABLET, EXTENDED RELEASE ORAL
Qty: 90 TABLET | Refills: 0 | Status: SHIPPED | OUTPATIENT
Start: 2023-05-09

## 2023-06-02 ENCOUNTER — OFFICE VISIT (OUTPATIENT)
Dept: FAMILY MEDICINE CLINIC | Facility: CLINIC | Age: 28
End: 2023-06-02

## 2023-06-02 VITALS
HEIGHT: 69 IN | WEIGHT: 188 LBS | HEART RATE: 66 BPM | TEMPERATURE: 97.8 F | SYSTOLIC BLOOD PRESSURE: 120 MMHG | BODY MASS INDEX: 27.85 KG/M2 | RESPIRATION RATE: 18 BRPM | DIASTOLIC BLOOD PRESSURE: 70 MMHG | OXYGEN SATURATION: 99 %

## 2023-06-02 DIAGNOSIS — E78.00 ELEVATED LDL CHOLESTEROL LEVEL: ICD-10-CM

## 2023-06-02 DIAGNOSIS — Z79.4 TYPE 2 DIABETES MELLITUS WITHOUT COMPLICATION, WITH LONG-TERM CURRENT USE OF INSULIN: Primary | ICD-10-CM

## 2023-06-02 DIAGNOSIS — B07.9 WART OF HAND: ICD-10-CM

## 2023-06-02 DIAGNOSIS — E11.9 TYPE 2 DIABETES MELLITUS WITHOUT COMPLICATION, WITH LONG-TERM CURRENT USE OF INSULIN: Primary | ICD-10-CM

## 2023-06-02 RX ORDER — METFORMIN HYDROCHLORIDE 500 MG/1
500 TABLET, EXTENDED RELEASE ORAL
Qty: 90 TABLET | Refills: 1 | Status: SHIPPED | OUTPATIENT
Start: 2023-06-02

## 2023-06-02 NOTE — ASSESSMENT & PLAN NOTE
Diabetes is new, on metformin daily .   Continue current treatment regimen.  Dietary recommendations for ADA diet.  Regular aerobic exercise.  Reminded to get yearly retinal exam.  pt has lost about 18 pounds since last visit.     Continue great job monitoring diet.   Diabetes will be reassessed in 3 months.

## 2023-06-02 NOTE — PROGRESS NOTES
"    Chief Complaint  Diabetes (Interpreteur [id number])    Subjective    History of Present Illness {CC  Problem List  Visit  Diagnosis   Encounters  Notes  Medications  Labs  Result Review Imaging  Media :23}     Gema Currie presents to [facility] for   History of Present Illness   89 yo male presents for follow up visit. States he is doing well. States he is not checking BS at home, no meter.   He has not had any issues dizzines or lightheadedness.    He has been monitoring diet, has lost about 20 pounds since last visit.    Taking metformin as prescribed          Social History     Socioeconomic History   • Marital status: Single   Tobacco Use   • Smoking status: Never   Substance and Sexual Activity   • Alcohol use: Not Currently     Alcohol/week: 10.0 standard drinks     Types: 5 Glasses of wine, 5 Shots of liquor per week     Comment: I stopped to drink alcohol since my diabet was diagnosed   • Drug use: Never   • Sexual activity: Yes     Partners: Female     Birth control/protection: Condom      Objective     Vital Signs:   /70 (BP Location: Left arm, Patient Position: Sitting, Cuff Size: Large Adult)   Pulse 66   Temp 97.8 °F (36.6 °C) (Infrared)   Resp 18   Ht 175.3 cm (69.02\")   Wt 85.3 kg (188 lb)   SpO2 99%   BMI 27.75 kg/m²   Physical Exam  Constitutional:       General: He is not in acute distress.     Appearance: He is not ill-appearing.   HENT:      Head: Normocephalic.      Right Ear: Tympanic membrane normal.      Left Ear: Tympanic membrane normal.   Cardiovascular:      Rate and Rhythm: Regular rhythm.      Heart sounds: Normal heart sounds.   Pulmonary:      Effort: No respiratory distress.      Breath sounds: Normal breath sounds. No wheezing.   Musculoskeletal:      Right lower leg: No edema.      Left lower leg: No edema.   Skin:     Comments: Wart middle finger L hand   Neurological:      Mental Status: He is alert and oriented to " person, place, and time.        Result Review  Data Reviewed:{ Labs  Result Review  Imaging  Med Tab  Media :23}   The following data was reviewed by: Tootie Roberson MD on 06/02/2023  Lab Results - Last 18 Months   Lab Units 01/27/23  1444 12/09/22  1138   BUN mg/dL  --  7   CREATININE mg/dL  --  1.04   SODIUM mmol/L  --  142   POTASSIUM mmol/L  --  4.4   CHLORIDE mmol/L  --  103   CALCIUM mg/dL  --  9.6   ALBUMIN g/dL  --  4.7   BILIRUBIN mg/dL  --  0.2   ALK PHOS IU/L  --  97   AST (SGOT) IU/L  --  27   ALT (SGPT) IU/L  --  22   CHOLESTEROL mg/dL  --  188   TRIGLYCERIDES mg/dL  --  107   HDL CHOL mg/dL  --  33*   VLDL CHOLESTEROL LUIS mg/dL  --  20   LDL CHOL mg/dL  --  135*   HEMOGLOBIN A1C % 7.6*  --    WBC x10E3/uL  --  5.1   RBC x10E6/uL  --  6.07*   HEMATOCRIT %  --  46.4   MCV fL  --  76*   MCH pg  --  23.6*   TSH uIU/mL  --  2.660              Current Outpatient Medications:   •  metFORMIN ER (GLUCOPHAGE-XR) 500 MG 24 hr tablet, Take 1 tablet by mouth Daily With Breakfast., Disp: 90 tablet, Rfl: 1  •  simethicone (Gas-X) 80 MG chewable tablet, Chew 1 tablet Every 6 (Six) Hours As Needed for Flatulence. (Patient not taking: Reported on 6/2/2023), Disp: 20 tablet, Rfl: 3      Assessment and Plan {CC Problem List  Visit Diagnosis  ROS  Review (Popup)  Health Maintenance  Quality  BestPractice  Medications  SmartSets  SnapShot Encounters  Media :23}   Problem List Items Addressed This Visit        Cardiac and Vasculature    Elevated LDL cholesterol level    Relevant Orders    Lipid Panel       Endocrine and Metabolic    Type 2 diabetes mellitus, with long-term current use of insulin - Primary    Current Assessment & Plan     Diabetes is new, on metformin daily .   Continue current treatment regimen.  Dietary recommendations for ADA diet.  Regular aerobic exercise.  Reminded to get yearly retinal exam.  pt has lost about 18 pounds since last visit.     Continue great job monitoring diet.    Diabetes will be reassessed in 3 months.         Relevant Medications    metFORMIN ER (GLUCOPHAGE-XR) 500 MG 24 hr tablet    Other Relevant Orders    Comprehensive metabolic panel    Hemoglobin A1c    CBC No Differential    Microalbumin / Creatinine Urine Ratio - Urine, Clean Catch   Other Visit Diagnoses     Wart of hand          Will try otc wart cream first, if no improvement go to Dermatologist for removal     Itchy ears, will try otc allergy medication (non drowsy)         Follow Up   Return in about 4 months (around 10/2/2023).  Patient was given instructions and counseling regarding his condition or for health maintenance advice. Please see specific information pulled into the AVS if appropriate.    EpicAct:[email or web address]_ORDERS,RunParams:STARTUPTYPE=FOLLOW    MR_[email or web address]_DISCHARGE  Answers for HPI/ROS submitted by the patient on 5/29/2023  What is the primary reason for your visit?: Diabetes

## 2023-06-03 LAB
ALBUMIN SERPL-MCNC: 4.7 G/DL (ref 3.5–5.2)
ALBUMIN/CREAT UR: 3 MG/G CREAT (ref 0–29)
ALBUMIN/GLOB SERPL: 2.2 G/DL
ALP SERPL-CCNC: 91 U/L (ref 39–117)
ALT SERPL-CCNC: 37 U/L (ref 1–41)
AST SERPL-CCNC: 25 U/L (ref 1–40)
BILIRUB SERPL-MCNC: 0.2 MG/DL (ref 0–1.2)
BUN SERPL-MCNC: 7 MG/DL (ref 6–20)
BUN/CREAT SERPL: 6.4 (ref 7–25)
CALCIUM SERPL-MCNC: 9.7 MG/DL (ref 8.6–10.5)
CHLORIDE SERPL-SCNC: 105 MMOL/L (ref 98–107)
CHOLEST SERPL-MCNC: 195 MG/DL (ref 0–200)
CO2 SERPL-SCNC: 25.4 MMOL/L (ref 22–29)
CREAT SERPL-MCNC: 1.1 MG/DL (ref 0.76–1.27)
CREAT UR-MCNC: 110.8 MG/DL
EGFRCR SERPLBLD CKD-EPI 2021: 93.2 ML/MIN/1.73
ERYTHROCYTE [DISTWIDTH] IN BLOOD BY AUTOMATED COUNT: 13.8 % (ref 12.3–15.4)
GLOBULIN SER CALC-MCNC: 2.1 GM/DL
GLUCOSE SERPL-MCNC: 97 MG/DL (ref 65–99)
HBA1C MFR BLD: 6 % (ref 4.8–5.6)
HCT VFR BLD AUTO: 44.3 % (ref 37.5–51)
HDLC SERPL-MCNC: 35 MG/DL (ref 40–60)
HGB BLD-MCNC: 13.4 G/DL (ref 13–17.7)
LDLC SERPL CALC-MCNC: 140 MG/DL (ref 0–100)
MCH RBC QN AUTO: 23.2 PG (ref 26.6–33)
MCHC RBC AUTO-ENTMCNC: 30.2 G/DL (ref 31.5–35.7)
MCV RBC AUTO: 76.8 FL (ref 79–97)
MICROALBUMIN UR-MCNC: 3.1 UG/ML
PLATELET # BLD AUTO: 215 10*3/MM3 (ref 140–450)
POTASSIUM SERPL-SCNC: 3.9 MMOL/L (ref 3.5–5.2)
PROT SERPL-MCNC: 6.8 G/DL (ref 6–8.5)
RBC # BLD AUTO: 5.77 10*6/MM3 (ref 4.14–5.8)
SODIUM SERPL-SCNC: 143 MMOL/L (ref 136–145)
TRIGL SERPL-MCNC: 107 MG/DL (ref 0–150)
VLDLC SERPL CALC-MCNC: 20 MG/DL (ref 5–40)
WBC # BLD AUTO: 5.12 10*3/MM3 (ref 3.4–10.8)

## 2023-08-14 RX ORDER — METFORMIN HYDROCHLORIDE 500 MG/1
TABLET, EXTENDED RELEASE ORAL
Qty: 90 TABLET | Refills: 1 | Status: SHIPPED | OUTPATIENT
Start: 2023-08-14

## 2023-10-09 ENCOUNTER — OFFICE VISIT (OUTPATIENT)
Dept: FAMILY MEDICINE CLINIC | Facility: CLINIC | Age: 28
End: 2023-10-09
Payer: COMMERCIAL

## 2023-10-09 VITALS
HEART RATE: 69 BPM | OXYGEN SATURATION: 98 % | WEIGHT: 189.8 LBS | HEIGHT: 69 IN | SYSTOLIC BLOOD PRESSURE: 106 MMHG | BODY MASS INDEX: 28.11 KG/M2 | DIASTOLIC BLOOD PRESSURE: 68 MMHG | TEMPERATURE: 97.8 F

## 2023-10-09 DIAGNOSIS — E11.9 TYPE 2 DIABETES MELLITUS WITHOUT COMPLICATION, WITH LONG-TERM CURRENT USE OF INSULIN: Primary | ICD-10-CM

## 2023-10-09 DIAGNOSIS — E78.00 ELEVATED LDL CHOLESTEROL LEVEL: ICD-10-CM

## 2023-10-09 DIAGNOSIS — Z79.4 TYPE 2 DIABETES MELLITUS WITHOUT COMPLICATION, WITH LONG-TERM CURRENT USE OF INSULIN: Primary | ICD-10-CM

## 2023-10-09 LAB
ALBUMIN SERPL-MCNC: 4.8 G/DL (ref 3.5–5.2)
ALBUMIN/GLOB SERPL: 2.8 G/DL
ALP SERPL-CCNC: 74 U/L (ref 39–117)
ALT SERPL-CCNC: 26 U/L (ref 1–41)
AST SERPL-CCNC: 26 U/L (ref 1–40)
BILIRUB SERPL-MCNC: 0.4 MG/DL (ref 0–1.2)
BUN SERPL-MCNC: 12 MG/DL (ref 6–20)
BUN/CREAT SERPL: 11.7 (ref 7–25)
CALCIUM SERPL-MCNC: 9.3 MG/DL (ref 8.6–10.5)
CHLORIDE SERPL-SCNC: 105 MMOL/L (ref 98–107)
CHOLEST SERPL-MCNC: 164 MG/DL (ref 0–200)
CO2 SERPL-SCNC: 26.9 MMOL/L (ref 22–29)
CREAT SERPL-MCNC: 1.03 MG/DL (ref 0.76–1.27)
EGFRCR SERPLBLD CKD-EPI 2021: 100.8 ML/MIN/1.73
GLOBULIN SER CALC-MCNC: 1.7 GM/DL
GLUCOSE SERPL-MCNC: 121 MG/DL (ref 65–99)
HBA1C MFR BLD: 6.4 % (ref 4.8–5.6)
HDLC SERPL-MCNC: 33 MG/DL (ref 40–60)
LDLC SERPL CALC-MCNC: 103 MG/DL (ref 0–100)
POTASSIUM SERPL-SCNC: 3.9 MMOL/L (ref 3.5–5.2)
PROT SERPL-MCNC: 6.5 G/DL (ref 6–8.5)
SODIUM SERPL-SCNC: 142 MMOL/L (ref 136–145)
TRIGL SERPL-MCNC: 157 MG/DL (ref 0–150)
VLDLC SERPL CALC-MCNC: 28 MG/DL (ref 5–40)

## 2023-10-09 NOTE — PROGRESS NOTES
"    Chief Complaint  Diabetes (Follow up/Fasting/) and Heartburn (One month//)    Subjective    History of Present Illness {CC  Problem List  Visit  Diagnosis   Encounters  Notes  Medications  Labs  Result Review Imaging  Media :23}     Gema Currie presents to [facility] for   History of Present Illness     27 yo male present for follow up visit. He states he is doing well monitoring diet.Has cut back on carbs. Taking medication as prescribed    Social History     Socioeconomic History    Marital status: Single   Tobacco Use    Smoking status: Never   Substance and Sexual Activity    Alcohol use: Not Currently     Alcohol/week: 10.0 standard drinks of alcohol     Types: 5 Glasses of wine, 5 Shots of liquor per week     Comment: I stopped to drink alcohol since my diabet was diagnosed    Drug use: Never    Sexual activity: Yes     Partners: Female     Birth control/protection: Condom      Objective     Vital Signs:   /68 (BP Location: Left arm, Patient Position: Sitting, Cuff Size: Large Adult)   Pulse 69   Temp 97.8 øF (36.6 øC)   Ht 175.3 cm (69.02\")   Wt 86.1 kg (189 lb 12.8 oz)   SpO2 98%   BMI 28.02 kg/mý   Physical Exam  Constitutional:       General: He is not in acute distress.     Appearance: He is not ill-appearing.   Cardiovascular:      Rate and Rhythm: Regular rhythm.      Heart sounds: Normal heart sounds.   Pulmonary:      Breath sounds: Normal breath sounds. No wheezing.   Neurological:      Mental Status: He is alert and oriented to person, place, and time.   Psychiatric:         Mood and Affect: Mood normal.        Result Review  Data Reviewed:{ Labs  Result Review  Imaging  Med Tab  Media :23}   The following data was reviewed by: Tootie Roberson MD on 10/09/2023  Lab Results - Last 18 Months   Lab Units 06/02/23  0851 01/27/23  1444 12/09/22  1138   BUN mg/dL 7  --  7   CREATININE mg/dL 1.10  --  1.04   SODIUM mmol/L 143  --  142 "   POTASSIUM mmol/L 3.9  --  4.4   CHLORIDE mmol/L 105  --  103   CALCIUM mg/dL 9.7  --  9.6   ALBUMIN g/dL 4.7  --  4.7   BILIRUBIN mg/dL 0.2  --  0.2   ALK PHOS U/L 91  --  97   AST (SGOT) U/L 25  --  27   ALT (SGPT) U/L 37  --  22   CHOLESTEROL mg/dL 195  --  188   TRIGLYCERIDES mg/dL 107  --  107   HDL CHOL mg/dL 35*  --  33*   VLDL CHOLESTEROL LUIS mg/dL 20  --  20   LDL CHOL mg/dL 140*  --  135*   HEMOGLOBIN A1C % 6.00* 7.6*  --    MICROALB UR ug/mL 3.1  --   --    WBC 10*3/mm3 5.12  --  5.1   RBC 10*6/mm3 5.77  --  6.07*   HEMATOCRIT % 44.3  --  46.4   MCV fL 76.8*  --  76*   MCH pg 23.2*  --  23.6*   TSH uIU/mL  --   --  2.660              Current Outpatient Medications:     metFORMIN ER (GLUCOPHAGE-XR) 500 MG 24 hr tablet, TAKE 1 TABLET BY MOUTH EVERY DAY WITH BREAKFAST, Disp: 90 tablet, Rfl: 1      Assessment and Plan {CC Problem List  Visit Diagnosis  ROS  Review (Popup)  Health Maintenance  Quality  BestPractice  Medications  SmartSets  SnapShot Encounters  Media :23}   Problem List Items Addressed This Visit          Cardiac and Vasculature    Elevated LDL cholesterol level    Relevant Orders    Lipid panel       Endocrine and Metabolic    Type 2 diabetes mellitus, with long-term current use of insulin - Primary    Relevant Orders    Comprehensive metabolic panel    Hemoglobin A1c     Continue to monitor diet   Monitor A1c check today   Continue metformin as prescribed.   Recheck in 3-4 months     Cholesterol check today lipid panel  Advise to eat high fiber low fat diet   Add fiber supplement   Omega 3           Follow Up   Return in about 4 months (around 2/9/2024) for with new provider check diabetes. .  Patient was given instructions and counseling regarding his condition or for health maintenance advice. Please see specific information pulled into the AVS if appropriate.    EpicAct:[email or web address]_ORDERS,RunParams:STARTUPTYPE=FOLLOW    [email or web address]_DISCHARGE  Answers submitted by the patient for  this visit:  Primary Reason for Visit (Submitted on 10/9/2023)  What is the primary reason for your visit?: Other  Other (Submitted on 10/9/2023)  Please describe your symptoms.: My cholesterol was high, the level of my A1C got down and some stomach burning  Have you had these symptoms before?: Yes  How long have you been having these symptoms?: Greater than 2 weeks  Please list any medications you are currently taking for this condition.: Metformin hcr Er 500mg  Please describe any probable cause for these symptoms. : I don't know exactly the cause of that symptoms

## 2023-12-11 RX ORDER — METFORMIN HYDROCHLORIDE 500 MG/1
500 TABLET, EXTENDED RELEASE ORAL
Qty: 90 TABLET | Refills: 1 | Status: SHIPPED | OUTPATIENT
Start: 2023-12-11

## 2023-12-11 NOTE — TELEPHONE ENCOUNTER
Sent request to David to anel has appt coming up . Anel refill to [facility]

## 2024-01-02 RX ORDER — METFORMIN HYDROCHLORIDE 500 MG/1
500 TABLET, EXTENDED RELEASE ORAL
Qty: 90 TABLET | Refills: 0 | Status: SHIPPED | OUTPATIENT
Start: 2024-01-02

## 2024-02-09 ENCOUNTER — OFFICE VISIT (OUTPATIENT)
Dept: FAMILY MEDICINE CLINIC | Facility: CLINIC | Age: 29
End: 2024-02-09
Payer: COMMERCIAL

## 2024-02-09 VITALS
HEIGHT: 69 IN | HEART RATE: 78 BPM | OXYGEN SATURATION: 98 % | WEIGHT: 186.6 LBS | SYSTOLIC BLOOD PRESSURE: 112 MMHG | TEMPERATURE: 98.5 F | DIASTOLIC BLOOD PRESSURE: 68 MMHG | BODY MASS INDEX: 27.64 KG/M2

## 2024-02-09 DIAGNOSIS — Z79.4 TYPE 2 DIABETES MELLITUS WITHOUT COMPLICATION, WITH LONG-TERM CURRENT USE OF INSULIN: Primary | Chronic | ICD-10-CM

## 2024-02-09 DIAGNOSIS — E11.9 TYPE 2 DIABETES MELLITUS WITHOUT COMPLICATION, WITH LONG-TERM CURRENT USE OF INSULIN: Primary | Chronic | ICD-10-CM

## 2024-02-09 DIAGNOSIS — Z11.59 NEED FOR HEPATITIS C SCREENING TEST: ICD-10-CM

## 2024-02-09 DIAGNOSIS — E78.00 ELEVATED LDL CHOLESTEROL LEVEL: ICD-10-CM

## 2024-02-09 RX ORDER — BLOOD-GLUCOSE METER
1 KIT MISCELLANEOUS AS NEEDED
Qty: 1 EACH | Refills: 0 | Status: SHIPPED | OUTPATIENT
Start: 2024-02-09 | End: 2024-03-10

## 2024-02-09 RX ORDER — BISMUTH SUBSALICYLATE 262MG/15ML
1 SUSPENSION, ORAL (FINAL DOSE FORM) ORAL 2 TIMES DAILY
Qty: 100 EACH | Refills: 5 | Status: SHIPPED | OUTPATIENT
Start: 2024-02-09

## 2024-02-09 RX ORDER — METFORMIN HYDROCHLORIDE 500 MG/1
500 TABLET, EXTENDED RELEASE ORAL
Qty: 90 TABLET | Refills: 1 | Status: SHIPPED | OUTPATIENT
Start: 2024-02-09

## 2024-02-09 NOTE — PROGRESS NOTES
"Chief Complaint  RE ESTABLISH FROM CHRISTINA hansen pcp and Diabetes (Hasn't been on medication unable to  medication and chol.)    Renetta Currie presents to [facility]  History of Present Illness  28yo Belgian-speaking male Patient was previously seen by PCP Dr. Roberson at [facility], however patient is new to me and is here for establishment of care visit for chronic medical conditions including diabetes type 2 and dyslipidemia.  Patient states he is doing well right now.    Patient desires to get off metformin as he has modified his diet and lifestyle.  Informed patient we will recheck A1c today and will patient let patient now if he can stop metformin, patient was understanding.      [place] [facility] [place] entire visit.  All patient questions answered and all concerns clarified prior to conclusion of the visit.    Instructed patient to follow-up with an eye doctor, either opthalmology or optometry with preference for opthalmology, for an annual diabetic eye exam.        Objective   Vital Signs:  /68   Pulse 78   Temp 98.5 °F (36.9 °C)   Ht 175.3 cm (69\")   Wt 84.6 kg (186 lb 9.6 oz)   SpO2 98%   BMI 27.56 kg/m²   Estimated body mass index is 27.56 kg/m² as calculated from the following:    Height as of this encounter: 175.3 cm (69\").    Weight as of this encounter: 84.6 kg (186 lb 9.6 oz).       BMI is >= 25 and <30. (Overweight) The following options were offered after discussion;: exercise counseling/recommendations and nutrition counseling/recommendations      Physical Exam  Constitutional:       Appearance: Normal appearance.   HENT:      Head: Normocephalic and atraumatic.   Eyes:      Conjunctiva/sclera: Conjunctivae normal.   Cardiovascular:      Rate and Rhythm: Normal rate and regular rhythm.      Heart sounds: Normal heart sounds.   Pulmonary:      " Effort: Pulmonary effort is normal.      Breath sounds: Normal breath sounds.   Abdominal:      General: Bowel sounds are normal.      Palpations: Abdomen is soft.      Comments: Non-tender   Skin:     General: Skin is warm.   Neurological:      General: No focal deficit present.      Mental Status: He is alert and oriented to person, place, and time.   Psychiatric:         Mood and Affect: Mood normal.         Behavior: Behavior normal.        Result Review :    The following data was reviewed by: MARCY Goodwin on 02/09/2024:  CMP          6/2/2023    08:51 10/9/2023    09:27   CMP   Glucose 97  121    BUN 7  12    Creatinine 1.10  1.03    Sodium 143  142    Potassium 3.9  3.9    Chloride 105  105    Calcium 9.7  9.3    Total Protein 6.8  6.5    Albumin 4.7  4.8    Globulin 2.1  1.7    Total Bilirubin 0.2  0.4    Alkaline Phosphatase 91  74    AST (SGOT) 25  26    ALT (SGPT) 37  26    BUN/Creatinine Ratio 6.4  11.7      CBC          6/2/2023    08:51   CBC   WBC 5.12    RBC 5.77    Hemoglobin 13.4    Hematocrit 44.3    MCV 76.8    MCH 23.2    MCHC 30.2    RDW 13.8    Platelets 215      Lipid Panel          6/2/2023    08:51 10/9/2023    09:27   Lipid Panel   Total Cholesterol 195  164    Triglycerides 107  157    HDL Cholesterol 35  33    VLDL Cholesterol 20  28    LDL Cholesterol  140  103        A1C Last 3 Results          6/2/2023    08:51 10/9/2023    09:27   HGBA1C Last 3 Results   Hemoglobin A1C 6.00  6.40      Microalbumin          6/2/2023    08:51   Microalbumin   Microalbumin, Urine 3.1                   Assessment and Plan     Diagnoses and all orders for this visit:    1. Type 2 diabetes mellitus without complication, with long-term current use of insulin (Primary)  Assessment & Plan:  Diabetes is  we will recheck A1c today .   Continue current treatment regimen.  Dietary recommendations for ADA diet.  Discussed ways to avoid symptomatic hypoglycemia.  Discussed foot care.  Reminded to  get yearly retinal exam.  Diabetes will be reassessed in 3 months.  Discussed the importance of healthy diet, nutrition, and lifestyle. Recommend low salt, fat/cholesterol diet and avoid concentrated sweets. Encouraged DASH diet along with fresh fruits & vegetables and low fat dairy products. Counseled patient to exercise/walk as tolerated. Avoid tobacco and alcohol use.    Discussed diabetes management and need for adequate BS control. Educated patient high A1c puts him/her at risk for MI, CVA, CKD, gastroparesis, foot ulcers, and frequent infections.   Pt informed of Rx for glucometer/test strips, and lancets as needed and explained to keep a blood sugar log. Return to office as instructed. Additionally diet compliance explained - avoid sugar candy, soda, high carbohydrate loads. Explained how losing weight can improve your health and achieve better blood sugar control. Being active can also help prevent or control the disorder. Recommended annual opthalmology follow -up due to diagnosis of diabetes. Annual Podiatry visit prn.      Orders:  -     metFORMIN ER (GLUCOPHAGE-XR) 500 MG 24 hr tablet; Take 1 tablet by mouth Daily With Breakfast.  Dispense: 90 tablet; Refill: 1  -     Hemoglobin A1c  -     MicroAlbumin, Urine, Random - Urine, Clean Catch  -     C-Peptide  -     Lancets Ultra Thin 30G misc; Inject 1 each under the skin into the appropriate area as directed 2 (Two) Times a Day.  Dispense: 100 each; Refill: 5  -     glucose blood test strip; Twice Daily  Dispense: 100 each; Refill: 5  -     glucose monitor monitoring kit; Use 1 each As Needed (DM2) for up to 30 days.  Dispense: 1 each; Refill: 0    2. Elevated LDL cholesterol level  Assessment & Plan:  Discussed the importance of healthy diet, nutrition, and lifestyle. Recommend low salt, fat/cholesterol diet and avoid concentrated sweets. Encouraged DASH diet along with fresh fruits & vegetables and low fat dairy products. Counseled patient to  exercise/walk as tolerated. Avoid tobacco and alcohol use.        3. Need for hepatitis C screening test  -     Hepatitis C Antibody             Follow Up     Return in about 3 months (around 5/9/2024) for Next scheduled follow up.  Patient was given instructions and counseling regarding his condition or for health maintenance advice. Please see specific information pulled into the AVS if appropriate.

## 2024-02-09 NOTE — ASSESSMENT & PLAN NOTE
Diabetes is  we will recheck A1c today .   Continue current treatment regimen.  Dietary recommendations for ADA diet.  Discussed ways to avoid symptomatic hypoglycemia.  Discussed foot care.  Reminded to get yearly retinal exam.  Diabetes will be reassessed in 3 months.  Discussed the importance of healthy diet, nutrition, and lifestyle. Recommend low salt, fat/cholesterol diet and avoid concentrated sweets. Encouraged DASH diet along with fresh fruits & vegetables and low fat dairy products. Counseled patient to exercise/walk as tolerated. Avoid tobacco and alcohol use.    Discussed diabetes management and need for adequate BS control. Educated patient high A1c puts him/her at risk for MI, CVA, CKD, gastroparesis, foot ulcers, and frequent infections.   Pt informed of Rx for glucometer/test strips, and lancets as needed and explained to keep a blood sugar log. Return to office as instructed. Additionally diet compliance explained - avoid sugar candy, soda, high carbohydrate loads. Explained how losing weight can improve your health and achieve better blood sugar control. Being active can also help prevent or control the disorder. Recommended annual opthalmology follow -up due to diagnosis of diabetes. Annual Podiatry visit prn.

## 2024-02-10 LAB
C PEPTIDE SERPL-MCNC: 2.7 NG/ML (ref 1.1–4.4)
HBA1C MFR BLD: 6.2 % (ref 4.8–5.6)
HCV IGG SERPL QL IA: NON REACTIVE
MICROALBUMIN UR-MCNC: <3 UG/ML

## 2024-03-18 ENCOUNTER — OFFICE VISIT (OUTPATIENT)
Dept: FAMILY MEDICINE CLINIC | Facility: CLINIC | Age: 29
End: 2024-03-18
Payer: COMMERCIAL

## 2024-03-18 VITALS
DIASTOLIC BLOOD PRESSURE: 76 MMHG | WEIGHT: 190.8 LBS | BODY MASS INDEX: 28.26 KG/M2 | OXYGEN SATURATION: 99 % | SYSTOLIC BLOOD PRESSURE: 110 MMHG | TEMPERATURE: 98.4 F | HEART RATE: 72 BPM | HEIGHT: 69 IN

## 2024-03-18 DIAGNOSIS — K29.70 GASTRITIS WITHOUT BLEEDING, UNSPECIFIED CHRONICITY, UNSPECIFIED GASTRITIS TYPE: ICD-10-CM

## 2024-03-18 DIAGNOSIS — Z79.4 TYPE 2 DIABETES MELLITUS WITHOUT COMPLICATION, WITH LONG-TERM CURRENT USE OF INSULIN: Chronic | ICD-10-CM

## 2024-03-18 DIAGNOSIS — Z00.00 ANNUAL PHYSICAL EXAM: Primary | ICD-10-CM

## 2024-03-18 DIAGNOSIS — E11.9 TYPE 2 DIABETES MELLITUS WITHOUT COMPLICATION, WITH LONG-TERM CURRENT USE OF INSULIN: Chronic | ICD-10-CM

## 2024-03-18 PROBLEM — Z11.59 NEED FOR HEPATITIS C SCREENING TEST: Status: RESOLVED | Noted: 2024-02-09 | Resolved: 2024-03-18

## 2024-03-18 RX ORDER — METFORMIN HYDROCHLORIDE 500 MG/1
500 TABLET, EXTENDED RELEASE ORAL
Qty: 90 TABLET | Refills: 1 | Status: SHIPPED | OUTPATIENT
Start: 2024-03-18

## 2024-03-18 NOTE — ASSESSMENT & PLAN NOTE
Diabetes is stable.   Continue current treatment regimen.  Reminded to bring in blood sugar diary at next visit.  Recommended an ADA diet.  Recommended a Mediterranean style of eating  Regular aerobic exercise.  Discussed ways to avoid symptomatic hypoglycemia.  Discussed sick day management.  Discussed foot care.  Reminded to get yearly retinal exam.  Diabetes will be reassessed in 6 months  Discussed the importance of healthy diet, nutrition, and lifestyle. Recommend low salt, fat/cholesterol diet and avoid concentrated sweets. Encouraged DASH diet along with fresh fruits & vegetables and low fat dairy products. Counseled patient to exercise/walk as tolerated. Avoid tobacco and alcohol use.  Discussed diabetes management and need for adequate BS control. Educated patient high A1c puts him/her at risk for MI, CVA, CKD, gastroparesis, foot ulcers, and frequent infections.   Pt informed of Rx for glucometer/test strips, and lancets as needed and explained to keep a blood sugar log. Return to office as instructed. Additionally diet compliance explained - avoid sugar candy, soda, high carbohydrate loads. Explained how losing weight can improve your health and achieve better blood sugar control. Being active can also help prevent or control the disorder. Recommended annual opthalmology follow -up due to diagnosis of diabetes. Annual Podiatry visit prn.

## 2024-03-18 NOTE — ASSESSMENT & PLAN NOTE
GERD precautions: Avoid fatty, greasy, spicy food, if current tobacco use stop smoking, stay upright for one hour before lying down. Also avoid Tobacco, caffeine, soda, mint, and garlic.  Will start on PPI if H. pylori test is negative otherwise treat it appropriately with antibiotics.

## 2024-03-18 NOTE — PROGRESS NOTES
"Chief Complaint  heartburn chest area (2 to 3 weeks , hurts with food and sometimes sleeping)    Renetta Currie presents to [facility]  History of Present Illness  29-year-old Uzbek-speaking male here for complaint of epigastric discomfort and annual physical.  Pt c/o heartburn sx x 2-3 weeks. Pt c/o epigastric discomfort and burning that radiates into his chest. Pt also c/o epigastric pain and reports it wakes me up at night.  Patient also complaining of bloating and flatus.  Patient denies any constipation reports she has a bowel movement every day.    Pt denied TOB use.    Used [employer]  for interpretation during the entire visit.  All patient questions answered and all concerns clarified prior to conclusion of the visit.   tablet and service provided by [facility] [place].  Patient reports he is still taking metformin.      Objective   Vital Signs:  /76   Pulse 72   Temp 98.4 °F (36.9 °C)   Ht 175.3 cm (69\")   Wt 86.5 kg (190 lb 12.8 oz)   SpO2 99%   BMI 28.18 kg/m²   Estimated body mass index is 28.18 kg/m² as calculated from the following:    Height as of this encounter: 175.3 cm (69\").    Weight as of this encounter: 86.5 kg (190 lb 12.8 oz).               Physical Exam  Constitutional:       Appearance: Normal appearance.   HENT:      Head: Normocephalic and atraumatic.   Eyes:      Conjunctiva/sclera: Conjunctivae normal.   Cardiovascular:      Rate and Rhythm: Normal rate and regular rhythm.      Heart sounds: Normal heart sounds.   Pulmonary:      Effort: Pulmonary effort is normal.      Breath sounds: Normal breath sounds.   Abdominal:      General: Bowel sounds are normal.      Palpations: Abdomen is soft.      Comments: Non-tender   Skin:     General: Skin is warm.   Neurological:      General: No focal deficit present.      Mental Status: He is alert and oriented to person, place, and time.      Comments: Patient status post " normal diabetic foot exam today.  Normal gross motor and sensory sensation bilateral feet.  Skin intact bilateral feet.  Normal monofilament test bilateral feet.  Normal pedal pulses bilaterally.     Psychiatric:         Mood and Affect: Mood normal.         Behavior: Behavior normal.        Result Review :    The following data was reviewed by: MARCY Goodwin on 03/18/2024:  CMP          6/2/2023    08:51 10/9/2023    09:27   CMP   Glucose 97  121    BUN 7  12    Creatinine 1.10  1.03    Sodium 143  142    Potassium 3.9  3.9    Chloride 105  105    Calcium 9.7  9.3    Total Protein 6.8  6.5    Albumin 4.7  4.8    Globulin 2.1  1.7    Total Bilirubin 0.2  0.4    Alkaline Phosphatase 91  74    AST (SGOT) 25  26    ALT (SGPT) 37  26    BUN/Creatinine Ratio 6.4  11.7      CBC          6/2/2023    08:51   CBC   WBC 5.12    RBC 5.77    Hemoglobin 13.4    Hematocrit 44.3    MCV 76.8    MCH 23.2    MCHC 30.2    RDW 13.8    Platelets 215      Lipid Panel          6/2/2023    08:51 10/9/2023    09:27   Lipid Panel   Total Cholesterol 195  164    Triglycerides 107  157    HDL Cholesterol 35  33    VLDL Cholesterol 20  28    LDL Cholesterol  140  103        A1C Last 3 Results          6/2/2023    08:51 10/9/2023    09:27 2/9/2024    13:09   HGBA1C Last 3 Results   Hemoglobin A1C 6.00  6.40  6.2      Microalbumin          6/2/2023    08:51 2/9/2024    13:09   Microalbumin   Microalbumin, Urine 3.1  <3.0                   Assessment and Plan     Diagnoses and all orders for this visit:    1. Annual physical exam (Primary)  Assessment & Plan:  Counseling was provided on nutrition, physical activity, development, and injury prevention, dental health, and safe sex practices patient verbalizes understanding no additional questions were asked.           2. Gastritis without bleeding, unspecified chronicity, unspecified gastritis type  Assessment & Plan:  GERD precautions: Avoid fatty, greasy, spicy food, if current  tobacco use stop smoking, stay upright for one hour before lying down. Also avoid Tobacco, caffeine, soda, mint, and garlic.  Will start on PPI if H. pylori test is negative otherwise treat it appropriately with antibiotics.    Orders:  -     H. Pylori Breath Test - Breath, Lung; Future  -     Comprehensive Metabolic Panel    3. Type 2 diabetes mellitus without complication, with long-term current use of insulin  Assessment & Plan:  Diabetes is stable.   Continue current treatment regimen.  Reminded to bring in blood sugar diary at next visit.  Recommended an ADA diet.  Recommended a Mediterranean style of eating  Regular aerobic exercise.  Discussed ways to avoid symptomatic hypoglycemia.  Discussed sick day management.  Discussed foot care.  Reminded to get yearly retinal exam.  Diabetes will be reassessed in 6 months  Discussed the importance of healthy diet, nutrition, and lifestyle. Recommend low salt, fat/cholesterol diet and avoid concentrated sweets. Encouraged DASH diet along with fresh fruits & vegetables and low fat dairy products. Counseled patient to exercise/walk as tolerated. Avoid tobacco and alcohol use.  Discussed diabetes management and need for adequate BS control. Educated patient high A1c puts him/her at risk for MI, CVA, CKD, gastroparesis, foot ulcers, and frequent infections.   Pt informed of Rx for glucometer/test strips, and lancets as needed and explained to keep a blood sugar log. Return to office as instructed. Additionally diet compliance explained - avoid sugar candy, soda, high carbohydrate loads. Explained how losing weight can improve your health and achieve better blood sugar control. Being active can also help prevent or control the disorder. Recommended annual opthalmology follow -up due to diagnosis of diabetes. Annual Podiatry visit prn.      Orders:  -     metFORMIN ER (GLUCOPHAGE-XR) 500 MG 24 hr tablet; Take 1 tablet by mouth Daily With Breakfast.  Dispense: 90 tablet;  Refill: 1      Discussed diabetes management and need for adequate BS control. Educated patient high A1c puts him/her at risk for MI, CVA, CKD, gastroparesis, foot ulcers, and frequent infections.   Pt informed of Rx for glucometer/test strips, and lancets as needed and explained to keep a blood sugar log. Return to office as instructed. Additionally diet compliance explained - avoid sugar candy, soda, high carbohydrate loads. Explained how losing weight can improve your health and achieve better blood sugar control. Being active can also help prevent or control the disorder. Recommended annual opthalmology follow -up due to diagnosis of diabetes. Annual Podiatry visit prn.         Follow Up     Return in about 3 months (around 6/18/2024) for Next scheduled follow up.  Patient was given instructions and counseling regarding his condition or for health maintenance advice. Please see specific information pulled into the AVS if appropriate.         Answers submitted by the patient for this visit:  Primary Reason for Visit (Submitted on 3/16/2024)  What is the primary reason for your visit?: Other  Other (Submitted on 3/16/2024)  Please describe your symptoms.: I have severe heartburn when I eat and even when I don't eat anything I feel burns that rise from the top of my stomach to my chest  Have you had these symptoms before?: No  How long have you been having these symptoms?: 1-2 weeks  Please list any medications you are currently taking for this condition.: pepto Bismol

## 2024-03-19 DIAGNOSIS — A04.8 H. PYLORI INFECTION: Primary | ICD-10-CM

## 2024-03-19 LAB
ALBUMIN SERPL-MCNC: 4.6 G/DL (ref 4.3–5.2)
ALBUMIN/GLOB SERPL: 1.9 {RATIO} (ref 1.2–2.2)
ALP SERPL-CCNC: 78 IU/L (ref 44–121)
ALT SERPL-CCNC: 22 IU/L (ref 0–44)
AST SERPL-CCNC: 23 IU/L (ref 0–40)
BILIRUB SERPL-MCNC: <0.2 MG/DL (ref 0–1.2)
BUN SERPL-MCNC: 12 MG/DL (ref 6–20)
BUN/CREAT SERPL: 11 (ref 9–20)
CALCIUM SERPL-MCNC: 9.4 MG/DL (ref 8.7–10.2)
CHLORIDE SERPL-SCNC: 105 MMOL/L (ref 96–106)
CO2 SERPL-SCNC: 22 MMOL/L (ref 20–29)
CREAT SERPL-MCNC: 1.05 MG/DL (ref 0.76–1.27)
EGFRCR SERPLBLD CKD-EPI 2021: 98 ML/MIN/1.73
GLOBULIN SER CALC-MCNC: 2.4 G/DL (ref 1.5–4.5)
GLUCOSE SERPL-MCNC: 115 MG/DL (ref 70–99)
POTASSIUM SERPL-SCNC: 4.1 MMOL/L (ref 3.5–5.2)
PROT SERPL-MCNC: 7 G/DL (ref 6–8.5)
SODIUM SERPL-SCNC: 142 MMOL/L (ref 134–144)
UREA BREATH TEST QL: POSITIVE

## 2024-03-19 RX ORDER — OMEPRAZOLE 20 MG/1
20 CAPSULE, DELAYED RELEASE ORAL 2 TIMES DAILY
Qty: 28 CAPSULE | Refills: 0 | Status: SHIPPED | OUTPATIENT
Start: 2024-03-19

## 2024-03-19 RX ORDER — AMOXICILLIN 250 MG/1
750 CAPSULE ORAL 3 TIMES DAILY
Qty: 126 CAPSULE | Refills: 0 | Status: SHIPPED | OUTPATIENT
Start: 2024-03-19 | End: 2024-04-02

## 2024-03-19 RX ORDER — LEVOFLOXACIN 500 MG/1
500 TABLET, FILM COATED ORAL DAILY
Qty: 14 TABLET | Refills: 0 | Status: SHIPPED | OUTPATIENT
Start: 2024-03-19

## 2024-04-06 DIAGNOSIS — A04.8 H. PYLORI INFECTION: ICD-10-CM

## 2024-04-08 RX ORDER — OMEPRAZOLE 20 MG/1
20 CAPSULE, DELAYED RELEASE ORAL 2 TIMES DAILY
Qty: 28 CAPSULE | Refills: 3 | Status: SHIPPED | OUTPATIENT
Start: 2024-04-08

## 2024-05-10 ENCOUNTER — OFFICE VISIT (OUTPATIENT)
Dept: FAMILY MEDICINE CLINIC | Facility: CLINIC | Age: 29
End: 2024-05-10
Payer: COMMERCIAL

## 2024-05-10 VITALS
OXYGEN SATURATION: 100 % | WEIGHT: 193 LBS | HEART RATE: 79 BPM | HEIGHT: 69 IN | TEMPERATURE: 98 F | BODY MASS INDEX: 28.58 KG/M2 | RESPIRATION RATE: 18 BRPM | DIASTOLIC BLOOD PRESSURE: 72 MMHG | SYSTOLIC BLOOD PRESSURE: 110 MMHG

## 2024-05-10 DIAGNOSIS — Z79.4 TYPE 2 DIABETES MELLITUS WITHOUT COMPLICATION, WITH LONG-TERM CURRENT USE OF INSULIN: Chronic | ICD-10-CM

## 2024-05-10 DIAGNOSIS — S93.402A SPRAIN OF LEFT ANKLE, UNSPECIFIED LIGAMENT, INITIAL ENCOUNTER: ICD-10-CM

## 2024-05-10 DIAGNOSIS — E11.9 TYPE 2 DIABETES MELLITUS WITHOUT COMPLICATION, WITH LONG-TERM CURRENT USE OF INSULIN: Chronic | ICD-10-CM

## 2024-05-10 DIAGNOSIS — K29.70 GASTRITIS WITHOUT BLEEDING, UNSPECIFIED CHRONICITY, UNSPECIFIED GASTRITIS TYPE: Primary | ICD-10-CM

## 2024-05-10 PROBLEM — A04.8 H. PYLORI INFECTION: Status: RESOLVED | Noted: 2024-03-19 | Resolved: 2024-05-10

## 2024-05-10 PROCEDURE — 99214 OFFICE O/P EST MOD 30 MIN: CPT | Performed by: STUDENT IN AN ORGANIZED HEALTH CARE EDUCATION/TRAINING PROGRAM

## 2024-05-10 RX ORDER — BISMUTH SUBSALICYLATE 262MG/15ML
1 SUSPENSION, ORAL (FINAL DOSE FORM) ORAL 2 TIMES DAILY
Qty: 100 EACH | Refills: 5 | Status: SHIPPED | OUTPATIENT
Start: 2024-05-10

## 2024-05-10 RX ORDER — METFORMIN HYDROCHLORIDE 500 MG/1
500 TABLET, EXTENDED RELEASE ORAL
Qty: 90 TABLET | Refills: 1 | Status: SHIPPED | OUTPATIENT
Start: 2024-05-10

## 2024-05-14 LAB
HBA1C MFR BLD: 6.1 % (ref 4.8–5.6)
MICROALBUMIN UR-MCNC: 7.1 UG/ML

## 2024-05-24 ENCOUNTER — TREATMENT (OUTPATIENT)
Dept: PHYSICAL THERAPY | Facility: CLINIC | Age: 29
End: 2024-05-24
Payer: COMMERCIAL

## 2024-05-24 DIAGNOSIS — R29.898 DECREASED STRENGTH OF LOWER EXTREMITY: ICD-10-CM

## 2024-05-24 DIAGNOSIS — M21.42 PES PLANUS OF BOTH FEET: ICD-10-CM

## 2024-05-24 DIAGNOSIS — M25.572 ACUTE LEFT ANKLE PAIN: Primary | ICD-10-CM

## 2024-05-24 DIAGNOSIS — M21.41 PES PLANUS OF BOTH FEET: ICD-10-CM

## 2024-05-24 DIAGNOSIS — S93.402A SPRAIN OF LEFT ANKLE, UNSPECIFIED LIGAMENT, INITIAL ENCOUNTER: ICD-10-CM

## 2024-05-24 NOTE — PROGRESS NOTES
Physical Therapy Initial Evaluation and Plan of Care                                               [address], [address]                                                           [place], [place]                                                         [phone]    Patient: Gema Currie   : 1995  Diagnosis/ICD-10 Code:  Acute left ankle pain [M25.572]  Referring practitioner: Sharon Mccloud*  Date of Initial Visit: 2024  Today's Date: 2024  Patient seen for 1 sessions           Subjective Questionnaire: FAAM: 80/84 or 95%      Subjective Evaluation    History of Present Illness  Mechanism of injury: Pt reports left ankle pain ongoing for 2 months without known injury or trauma. He states that the pain has been worsening and has become more frequent over the past few weeks. Pain is felt mostly along the lateral aspect of his ankle and foot, inferior to the lateral malleolus. He describes a discomfort along this area that can become sharp at times when weight-bearing, but the sharp pain only lasts a few seconds. Pulling and discomfort in his L ankle comes on with prolonged standing and walking and will typically last for 15 minutes (no longer than 30 mins) and then dissipate on its own. He also reports difficulty with squatting and performing weight lifting exercises at the gym. The pain did not used to occur every day but is now happening 2-3 times per day.    Prior to onset of this pain, he was going to the gym regularly, for weight lifting and running on the treadmill, and playing soccer. He has discontinued these exercises due to ankle pain.     He also notes new onset of his R ankle/ foot swelling with associated burning along the dorsal aspect of the foot. This has been happening each day either when he wakes up in the morning or after prolonged sitting.    In , he injured his R knee which was treated with OP PT. 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, Quality of life: good    Pain  Current pain ratin  At worst pain ratin  Location: left ankle  Quality: discomfort, dull ache, squeezing, pressure, pulling and sharp  Relieving factors: rest and relaxation  Aggravating factors: ambulation, squatting, standing, lifting and prolonged positioning  Progression: worsening    Social Support  Lives with: alone    Patient Goals  Patient goals for therapy: return to sport/leisure activities, decreased edema, increased motion and decreased pain             Objective          Static Posture     Ankle/Foot   Ankle/Foot (Left): Calcaneovalgus and pes planus.   Ankle/Foot (Right): Calcaneovalgus and pes planus.     Observations     Right Ankle/Foot   Positive for edema.     Additional Ankle/Foot Observation Details  R ankle displays edema along top of foot and medial malleolus    Palpation     Additional Palpation Details  No TTP    Tenderness     Additional Tenderness Details  No TTP    Neurological Testing     Sensation     Ankle/Foot   Left Ankle/Foot   Intact: light touch    Right Ankle/Foot   Intact: light touch     Active Range of Motion   Left Hip   Normal active range of motion    Right Hip   Normal active range of motion  Left Knee   Normal active range of motion    Right Knee   Normal active range of motion  Left Ankle/Foot   Dorsiflexion (ke): 15 degrees   Plantar flexion: 32 degrees   Inversion: 8 degrees   Eversion: 17 degrees     Right Ankle/Foot   Dorsiflexion (ke): 11 degrees   Plantar flexion: 40 degrees   Inversion: 10 degrees   Eversion: 20 degrees     Passive Range of Motion   Left Ankle/Foot  Normal passive range of motion    Right Ankle/Foot  Normal passive range of motion    Strength/Myotome Testing     Left Hip   Planes of Motion   Flexion: 4+  Abduction: 4+    Right Hip   Planes of Motion   Flexion: 4+  Abduction: 4    Left Knee   Flexion: 4+  Extension: 5  Quadriceps contraction: good    Right Knee   Flexion:  4  Extension: 5  Quadriceps contraction: good    Left Ankle/Foot   Dorsiflexion: 5  Plantar flexion: 5  Inversion: 5  Eversion: 5  Great toe flexion: 5  Great toe extension: 5    Right Ankle/Foot   Dorsiflexion: 5  Plantar flexion: 4+  Inversion: 4+  Eversion: 4+  Great toe flexion: 5  Great toe extension: 5    Tests   Left Ankle/Foot   Negative for Homans' sign.     Right Ankle/Foot   Negative for anterior drawer, calcaneal squeeze, Homans' sign, posterior drawer, valgus stress metatarsophalangeal and varus stress metatarsophalangeal.     Swelling   Left Ankle/Foot   Figure 8: 44 cm    Right Ankle/Foot   Figure 8: 46 cm    Ambulation     Observational Gait   Left foot contact pattern: heel to toe  Right foot contact pattern: heel to toe  Base of support: normal    Additional Observational Gait Details  Exhibits increased heel strike bilaterally, with decreased ankle roll over and bilateral ankle eversion- no hip external rotation observed          Assessment & Plan       Assessment  Impairments: abnormal gait, abnormal or restricted ROM, activity intolerance, impaired physical strength, lacks appropriate home exercise program and pain with function   Functional limitations: lifting, walking, uncomfortable because of pain, sitting and standing   Assessment details: Gema is a 28 y/o male reporting to OP PT for initial evaluation regarding L ankle pain which has been present for 2 months without known injury or trauma. He describes left lateral ankle pain and discomfort which comes on 2-3 times per day, most often when he is walking or initially stands after prolonged sitting. Gradi states that he has pain at work when he is required to stand for 5-6 hours at a time. He used to perform weight-lifting at the gym but has had to discontinue lower extremity exercises such as squats due to increased pain. He has also stopped playing soccer. Upon evaluation, he exhibits bilateral pes planus, swelling in his right foot, and  he ambulates with bilateral ankle eversion and decreased ankle rollover through stance phase bilaterally. He demonstrates deficits in left ankle and hip strength as well. Skilled OP PT is deemed reasonable and necessary in order to address these deficits, limitations, and impairments and assist with return to prior level of function.   Prognosis: good    Goals  Plan Goals: Short Term: 3 weeks  1. Pt will be independent with initial HEP  2. Pt will report >/= 50% improvement in L ankle pain  3. Pt will report and demonstrate decreased swelling in his R ankle and have figure 8 measurements within 1 cm to his L ankle  4. Pt will report decreased pain with walking on even surfaces for > 15 minutes for indication of improved ability to perform ADLs and work duties  5. Pt will demonstrate symmetrical bilateral ankle range of motion in all planes for indication of symmetrical ability to perform all activities    Long Term: 8 weeks  1. Pt will be independent and compliant with progressed HEP  2. Pt will report 0-2/10 pain in his L ankle  3. Pt will demonstrate 5/5 strength in his L ankle in all planes of motion for indication of improved ability to perform all activities  4. Pt will report return to or ability to return to full exercise at the gym and/ or playing soccer without L ankle pain for improved quality of life    Plan  Therapy options: will be seen for skilled therapy services  Planned modality interventions: cryotherapy  Planned therapy interventions: functional ROM exercises, home exercise program, therapeutic activities, stretching, strengthening and neuromuscular re-education  Frequency: 1x week  Duration in weeks: 8  Treatment plan discussed with: patient        Manual Therapy:    0     mins  34864;  Therapeutic Exercise:    10     mins  37577;     Neuromuscular Nelsy:    0    mins  98158;    Therapeutic Activity:     10     mins  03301;     Gait Trainin     mins  11815;     Ultrasound:     0     mins   51620;    Electrical Stimulation:    0     mins  69660 ( );  Dry Needling     0     mins self-pay    Timed Treatment:   20   mins   Total Treatment:     43   mins    PT SIGNATURE: Alexis Guerrero PT, DPT  [place] License #: [id number]   Alexis Guerrero PT, 05/24/24, 7:39 AM EDT  DATE TREATMENT INITIATED: 5/24/2024    Initial Certification  Certification Period: 8/22/2024  I certify that the therapy services are furnished while this patient is under my care.  The services outlined above are required by this patient, and will be reviewed every 90 days.     PHYSICIAN: Sharon Mccloud, MARCY      DATE:     Please sign and return via fax to [phone].. Thank you, [facility].

## 2024-06-10 DIAGNOSIS — Z79.4 TYPE 2 DIABETES MELLITUS WITHOUT COMPLICATION, WITH LONG-TERM CURRENT USE OF INSULIN: Chronic | ICD-10-CM

## 2024-06-10 DIAGNOSIS — E11.9 TYPE 2 DIABETES MELLITUS WITHOUT COMPLICATION, WITH LONG-TERM CURRENT USE OF INSULIN: Chronic | ICD-10-CM

## 2024-06-10 RX ORDER — METFORMIN HYDROCHLORIDE 500 MG/1
500 TABLET, EXTENDED RELEASE ORAL
Qty: 90 TABLET | Refills: 1 | Status: SHIPPED | OUTPATIENT
Start: 2024-06-10 | End: 2024-06-14 | Stop reason: SDUPTHER

## 2024-06-14 DIAGNOSIS — E11.9 TYPE 2 DIABETES MELLITUS WITHOUT COMPLICATION, WITH LONG-TERM CURRENT USE OF INSULIN: Chronic | ICD-10-CM

## 2024-06-14 DIAGNOSIS — Z79.4 TYPE 2 DIABETES MELLITUS WITHOUT COMPLICATION, WITH LONG-TERM CURRENT USE OF INSULIN: Chronic | ICD-10-CM

## 2024-06-14 RX ORDER — METFORMIN HYDROCHLORIDE 500 MG/1
500 TABLET, EXTENDED RELEASE ORAL
Qty: 90 TABLET | Refills: 1 | Status: SHIPPED | OUTPATIENT
Start: 2024-06-14

## 2024-08-08 ENCOUNTER — DOCUMENTATION (OUTPATIENT)
Dept: PHYSICAL THERAPY | Facility: CLINIC | Age: 29
End: 2024-08-08
Payer: COMMERCIAL

## 2024-08-14 ENCOUNTER — OFFICE VISIT (OUTPATIENT)
Dept: FAMILY MEDICINE CLINIC | Facility: CLINIC | Age: 29
End: 2024-08-14
Payer: COMMERCIAL

## 2024-08-14 VITALS
SYSTOLIC BLOOD PRESSURE: 124 MMHG | DIASTOLIC BLOOD PRESSURE: 74 MMHG | HEART RATE: 97 BPM | WEIGHT: 193.8 LBS | OXYGEN SATURATION: 99 % | TEMPERATURE: 97.7 F | HEIGHT: 69 IN | BODY MASS INDEX: 28.71 KG/M2
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
## 2024-08-14 DIAGNOSIS — S46.811S TRAPEZIUS STRAIN, RIGHT, SEQUELA: ICD-10-CM

## 2024-08-14 DIAGNOSIS — G44.319 ACUTE POST-TRAUMATIC HEADACHE, NOT INTRACTABLE: ICD-10-CM

## 2024-08-14 DIAGNOSIS — M25.511 ACUTE PAIN OF RIGHT SHOULDER: ICD-10-CM

## 2024-08-14 PROBLEM — Z00.00 ANNUAL PHYSICAL EXAM: Status: RESOLVED | Noted: 2024-03-18 | Resolved: 2024-08-14

## 2024-08-14 PROCEDURE — 99214 OFFICE O/P EST MOD 30 MIN: CPT | Performed by: STUDENT IN AN ORGANIZED HEALTH CARE EDUCATION/TRAINING PROGRAM

## 2024-08-14 RX ORDER — SUMATRIPTAN 50 MG/1
TABLET, FILM COATED ORAL
Qty: 9 TABLET | Refills: 3 | Status: SHIPPED | OUTPATIENT
Start: 2024-08-14

## 2024-08-14 RX ORDER — ACETAMINOPHEN 500 MG
500 TABLET ORAL EVERY 6 HOURS PRN
Qty: 100 TABLET | Refills: 0 | Status: SHIPPED | OUTPATIENT
Start: 2024-08-14 | End: 2024-09-13

## 2024-08-14 RX ORDER — BACLOFEN 10 MG/1
10 TABLET ORAL NIGHTLY PRN
Qty: 30 TABLET | Refills: 1 | Status: SHIPPED | OUTPATIENT
Start: 2024-08-14 | End: 2024-11-12

## 2024-08-14 RX ORDER — METHOCARBAMOL 500 MG/1
500 TABLET, FILM COATED ORAL 4 TIMES DAILY
COMMUNITY
Start: 2024-08-13

## 2024-08-14 RX ORDER — LIDOCAINE 50 MG/G
1 PATCH TOPICAL EVERY 24 HOURS
COMMUNITY
Start: 2024-08-13

## 2024-08-14 NOTE — ASSESSMENT & PLAN NOTE
Instructed patient to be light duty at work and avoid excessive activity at the right shoulder until resolution of current right shoulder pain and symptoms, patient was understanding.  Letter for work restriction provided to patient today.
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
## 2024-08-14 NOTE — ASSESSMENT & PLAN NOTE
Instructed patient to preferably take baclofen for muscle stiffness or spasms as it causes less sleepiness compared to Robaxin prescribed in the ER.

## 2024-08-16 ENCOUNTER — TELEPHONE (OUTPATIENT)
Dept: FAMILY MEDICINE CLINIC | Facility: CLINIC | Age: 29
End: 2024-08-16

## 2024-08-27 ENCOUNTER — OFFICE VISIT (OUTPATIENT)
Dept: NEUROLOGY | Facility: CLINIC | Age: 29
End: 2024-08-27
Payer: COMMERCIAL

## 2024-08-27 VITALS
OXYGEN SATURATION: 99 % | WEIGHT: 193 LBS | SYSTOLIC BLOOD PRESSURE: 124 MMHG | HEART RATE: 64 BPM | BODY MASS INDEX: 28.49 KG/M2 | DIASTOLIC BLOOD PRESSURE: 82 MMHG

## 2024-08-27 DIAGNOSIS — G44.319 ACUTE POST-TRAUMATIC HEADACHE, NOT INTRACTABLE: Primary | ICD-10-CM
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
## 2024-08-28 ENCOUNTER — TREATMENT (OUTPATIENT)
Dept: PHYSICAL THERAPY | Facility: CLINIC | Age: 29
End: 2024-08-28
Payer: COMMERCIAL
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
## 2024-08-28 DIAGNOSIS — M25.511 ACUTE PAIN OF RIGHT SHOULDER: Primary | ICD-10-CM

## 2024-08-28 PROCEDURE — 97014 ELECTRIC STIMULATION THERAPY: CPT | Performed by: PHYSICAL THERAPIST

## 2024-08-28 PROCEDURE — 97530 THERAPEUTIC ACTIVITIES: CPT | Performed by: PHYSICAL THERAPIST

## 2024-08-28 PROCEDURE — 97110 THERAPEUTIC EXERCISES: CPT | Performed by: PHYSICAL THERAPIST

## 2024-08-28 PROCEDURE — 97161 PT EVAL LOW COMPLEX 20 MIN: CPT | Performed by: PHYSICAL THERAPIST
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
## 2024-08-29 ENCOUNTER — PATIENT ROUNDING (BHMG ONLY) (OUTPATIENT)
Dept: NEUROLOGY | Facility: CLINIC | Age: 29
End: 2024-08-29
Payer: COMMERCIAL

## 2024-09-04 ENCOUNTER — TREATMENT (OUTPATIENT)
Dept: PHYSICAL THERAPY | Facility: CLINIC | Age: 29
End: 2024-09-04
Payer: COMMERCIAL

## 2024-09-04 ENCOUNTER — OFFICE VISIT (OUTPATIENT)
Dept: FAMILY MEDICINE CLINIC | Facility: CLINIC | Age: 29
End: 2024-09-04
Payer: COMMERCIAL

## 2024-09-04 VITALS
WEIGHT: 197.6 LBS | SYSTOLIC BLOOD PRESSURE: 114 MMHG | DIASTOLIC BLOOD PRESSURE: 64 MMHG | HEIGHT: 69 IN | TEMPERATURE: 98 F | RESPIRATION RATE: 16 BRPM | BODY MASS INDEX: 29.27 KG/M2 | OXYGEN SATURATION: 99 % | HEART RATE: 83 BPM

## 2024-09-04 DIAGNOSIS — G44.319 ACUTE POST-TRAUMATIC HEADACHE, NOT INTRACTABLE: ICD-10-CM

## 2024-09-04 DIAGNOSIS — M25.511 ACUTE PAIN OF RIGHT SHOULDER: ICD-10-CM
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
## 2024-09-04 DIAGNOSIS — M25.511 ACUTE PAIN OF RIGHT SHOULDER: Primary | ICD-10-CM

## 2024-09-04 PROBLEM — E11.9 TYPE 2 DIABETES MELLITUS, WITH LONG-TERM CURRENT USE OF INSULIN: Status: RESOLVED | Noted: 2023-02-24 | Resolved: 2024-09-04

## 2024-09-04 PROBLEM — Z79.4 TYPE 2 DIABETES MELLITUS, WITH LONG-TERM CURRENT USE OF INSULIN: Status: RESOLVED | Noted: 2023-02-24 | Resolved: 2024-09-04

## 2024-09-04 PROCEDURE — 97110 THERAPEUTIC EXERCISES: CPT | Performed by: PHYSICAL THERAPIST

## 2024-09-04 PROCEDURE — 99213 OFFICE O/P EST LOW 20 MIN: CPT | Performed by: STUDENT IN AN ORGANIZED HEALTH CARE EDUCATION/TRAINING PROGRAM

## 2024-09-04 PROCEDURE — 97530 THERAPEUTIC ACTIVITIES: CPT | Performed by: PHYSICAL THERAPIST

## 2024-09-04 RX ORDER — METFORMIN HCL 500 MG
500 TABLET, EXTENDED RELEASE 24 HR ORAL
Qty: 90 TABLET | Refills: 1 | Status: SHIPPED | OUTPATIENT
Start: 2024-09-04 | End: 2024-09-04

## 2024-09-04 RX ORDER — NAPROXEN 500 MG/1
500 TABLET ORAL 2 TIMES DAILY PRN
Qty: 60 TABLET | Refills: 2 | Status: SHIPPED | OUTPATIENT
Start: 2024-09-04

## 2024-09-04 RX ORDER — ACETAMINOPHEN 500 MG
500 TABLET ORAL EVERY 6 HOURS PRN
Qty: 100 TABLET | Refills: 0 | Status: SHIPPED | OUTPATIENT
Start: 2024-09-04 | End: 2024-10-04
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
## 2024-09-04 NOTE — ASSESSMENT & PLAN NOTE
Continue physical therapy.  Patient to return to clinic on 9/25/2024.  Instructed patient not to lift any more than than 1 pound with the right arm/hand, patient voiced understanding.  Recommend applying diclofenac gel as needed or take ibuprofen as needed for right shoulder pain.    Discussed possibility of partial or superficial rotator cuff tear and to allow it adequate opportunity to heal by avoiding excessive activity at the right shoulder.

## 2024-09-10 ENCOUNTER — TREATMENT (OUTPATIENT)
Dept: PHYSICAL THERAPY | Facility: CLINIC | Age: 29
End: 2024-09-10
Payer: COMMERCIAL
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
## 2024-09-10 DIAGNOSIS — M25.511 ACUTE PAIN OF RIGHT SHOULDER: Primary | ICD-10-CM

## 2024-09-10 PROCEDURE — 97530 THERAPEUTIC ACTIVITIES: CPT | Performed by: PHYSICAL THERAPIST

## 2024-09-10 PROCEDURE — 97110 THERAPEUTIC EXERCISES: CPT | Performed by: PHYSICAL THERAPIST
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
## 2024-09-13 ENCOUNTER — TREATMENT (OUTPATIENT)
Dept: PHYSICAL THERAPY | Facility: CLINIC | Age: 29
End: 2024-09-13
Payer: COMMERCIAL

## 2024-09-13 DIAGNOSIS — M25.511 ACUTE PAIN OF RIGHT SHOULDER: Primary | ICD-10-CM
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
## 2024-09-13 PROCEDURE — 97110 THERAPEUTIC EXERCISES: CPT | Performed by: PHYSICAL THERAPIST

## 2024-09-13 PROCEDURE — 97530 THERAPEUTIC ACTIVITIES: CPT | Performed by: PHYSICAL THERAPIST
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
## 2024-09-17 ENCOUNTER — TREATMENT (OUTPATIENT)
Dept: PHYSICAL THERAPY | Facility: CLINIC | Age: 29
End: 2024-09-17
Payer: COMMERCIAL
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
## 2024-09-17 DIAGNOSIS — M25.511 ACUTE PAIN OF RIGHT SHOULDER: Primary | ICD-10-CM

## 2024-09-17 PROCEDURE — 97110 THERAPEUTIC EXERCISES: CPT | Performed by: PHYSICAL THERAPIST

## 2024-09-17 PROCEDURE — 97530 THERAPEUTIC ACTIVITIES: CPT | Performed by: PHYSICAL THERAPIST

## 2024-09-20 ENCOUNTER — TREATMENT (OUTPATIENT)
Dept: PHYSICAL THERAPY | Facility: CLINIC | Age: 29
End: 2024-09-20
Payer: COMMERCIAL
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
## 2024-09-20 DIAGNOSIS — M25.511 ACUTE PAIN OF RIGHT SHOULDER: Primary | ICD-10-CM

## 2024-09-20 PROCEDURE — 97530 THERAPEUTIC ACTIVITIES: CPT | Performed by: PHYSICAL THERAPIST

## 2024-09-20 PROCEDURE — 97110 THERAPEUTIC EXERCISES: CPT | Performed by: PHYSICAL THERAPIST

## 2024-09-25 ENCOUNTER — TREATMENT (OUTPATIENT)
Dept: PHYSICAL THERAPY | Facility: CLINIC | Age: 29
End: 2024-09-25
Payer: COMMERCIAL

## 2024-09-25 ENCOUNTER — OFFICE VISIT (OUTPATIENT)
Dept: FAMILY MEDICINE CLINIC | Facility: CLINIC | Age: 29
End: 2024-09-25
Payer: COMMERCIAL

## 2024-09-25 VITALS
BODY MASS INDEX: 29.71 KG/M2 | OXYGEN SATURATION: 99 % | RESPIRATION RATE: 16 BRPM | WEIGHT: 200.6 LBS | SYSTOLIC BLOOD PRESSURE: 118 MMHG | HEART RATE: 87 BPM | HEIGHT: 69 IN | DIASTOLIC BLOOD PRESSURE: 84 MMHG | TEMPERATURE: 97.8 F

## 2024-09-25 DIAGNOSIS — G44.319 ACUTE POST-TRAUMATIC HEADACHE, NOT INTRACTABLE: Primary | ICD-10-CM

## 2024-09-25 DIAGNOSIS — M25.511 ACUTE PAIN OF RIGHT SHOULDER: ICD-10-CM
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
## 2024-09-25 DIAGNOSIS — M25.511 ACUTE PAIN OF RIGHT SHOULDER: Primary | ICD-10-CM

## 2024-09-25 PROCEDURE — 99213 OFFICE O/P EST LOW 20 MIN: CPT | Performed by: STUDENT IN AN ORGANIZED HEALTH CARE EDUCATION/TRAINING PROGRAM

## 2024-09-25 PROCEDURE — 97110 THERAPEUTIC EXERCISES: CPT | Performed by: PHYSICAL THERAPIST

## 2024-09-25 PROCEDURE — 97530 THERAPEUTIC ACTIVITIES: CPT | Performed by: PHYSICAL THERAPIST

## 2024-09-25 RX ORDER — METFORMIN HCL 500 MG
500 TABLET, EXTENDED RELEASE 24 HR ORAL
COMMUNITY
Start: 2024-09-04

## 2024-09-25 RX ORDER — IBUPROFEN 800 MG/1
800 TABLET, FILM COATED ORAL EVERY 8 HOURS PRN
COMMUNITY

## 2024-09-27 ENCOUNTER — TREATMENT (OUTPATIENT)
Dept: PHYSICAL THERAPY | Facility: CLINIC | Age: 29
End: 2024-09-27
Payer: COMMERCIAL

## 2024-09-27 DIAGNOSIS — M25.511 ACUTE PAIN OF RIGHT SHOULDER: Primary | ICD-10-CM
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
## 2024-09-27 PROCEDURE — 97110 THERAPEUTIC EXERCISES: CPT | Performed by: PHYSICAL THERAPIST

## 2024-09-27 PROCEDURE — 97530 THERAPEUTIC ACTIVITIES: CPT | Performed by: PHYSICAL THERAPIST

## 2024-10-25 ENCOUNTER — OFFICE VISIT (OUTPATIENT)
Dept: FAMILY MEDICINE CLINIC | Facility: CLINIC | Age: 29
End: 2024-10-25
Payer: COMMERCIAL

## 2024-10-25 VITALS
TEMPERATURE: 98.4 F | DIASTOLIC BLOOD PRESSURE: 66 MMHG | WEIGHT: 198.4 LBS | HEART RATE: 82 BPM | OXYGEN SATURATION: 98 % | BODY MASS INDEX: 29.38 KG/M2 | HEIGHT: 69 IN | SYSTOLIC BLOOD PRESSURE: 116 MMHG | RESPIRATION RATE: 16 BRPM

## 2024-10-25 DIAGNOSIS — G44.319 ACUTE POST-TRAUMATIC HEADACHE, NOT INTRACTABLE: ICD-10-CM

## 2024-10-25 DIAGNOSIS — S46.811S TRAPEZIUS STRAIN, RIGHT, SEQUELA: ICD-10-CM
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
## 2024-10-25 DIAGNOSIS — M25.511 ACUTE PAIN OF RIGHT SHOULDER: Primary | ICD-10-CM

## 2024-10-25 PROCEDURE — 99213 OFFICE O/P EST LOW 20 MIN: CPT | Performed by: STUDENT IN AN ORGANIZED HEALTH CARE EDUCATION/TRAINING PROGRAM
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
## 2024-11-15 ENCOUNTER — OFFICE VISIT (OUTPATIENT)
Dept: FAMILY MEDICINE CLINIC | Facility: CLINIC | Age: 29
End: 2024-11-15
Payer: COMMERCIAL

## 2024-11-15 ENCOUNTER — HOSPITAL ENCOUNTER (OUTPATIENT)
Dept: GENERAL RADIOLOGY | Facility: HOSPITAL | Age: 29
Discharge: HOME | End: 2024-11-15
Payer: COMMERCIAL

## 2024-11-15 VITALS
TEMPERATURE: 98.4 F | DIASTOLIC BLOOD PRESSURE: 74 MMHG | OXYGEN SATURATION: 99 % | WEIGHT: 200.6 LBS | SYSTOLIC BLOOD PRESSURE: 122 MMHG | RESPIRATION RATE: 16 BRPM | BODY MASS INDEX: 29.71 KG/M2 | HEART RATE: 83 BPM | HEIGHT: 69 IN

## 2024-11-15 DIAGNOSIS — S99.921S FOOT TRAUMA, RIGHT, SEQUELA: ICD-10-CM

## 2024-11-15 DIAGNOSIS — M79.89 SWELLING OF RIGHT FOOT: ICD-10-CM

## 2024-11-15 DIAGNOSIS — E78.2 MIXED HYPERLIPIDEMIA: ICD-10-CM

## 2024-11-15 DIAGNOSIS — E78.00 ELEVATED LDL CHOLESTEROL LEVEL: ICD-10-CM

## 2024-11-15 DIAGNOSIS — Z13.220 LIPID SCREENING: ICD-10-CM

## 2024-11-15 DIAGNOSIS — B07.9 VIRAL WART ON FINGER: ICD-10-CM

## 2024-11-15 DIAGNOSIS — E11.9 TYPE 2 DIABETES MELLITUS WITHOUT COMPLICATION, WITH LONG-TERM CURRENT USE OF INSULIN: Primary | Chronic | ICD-10-CM

## 2024-11-15 DIAGNOSIS — Z79.4 TYPE 2 DIABETES MELLITUS WITHOUT COMPLICATION, WITH LONG-TERM CURRENT USE OF INSULIN: Primary | Chronic | ICD-10-CM

## 2024-11-15 PROBLEM — S93.402A SPRAIN OF LEFT ANKLE: Status: RESOLVED | Noted: 2024-05-10 | Resolved: 2024-11-15
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
## 2024-11-15 PROBLEM — K29.70 GASTRITIS WITHOUT BLEEDING: Status: RESOLVED | Noted: 2024-03-18 | Resolved: 2024-11-15

## 2024-11-15 PROCEDURE — 73610 X-RAY EXAM OF ANKLE: CPT

## 2024-11-15 PROCEDURE — 73630 X-RAY EXAM OF FOOT: CPT

## 2024-11-15 RX ORDER — SUMATRIPTAN 50 MG/1
TABLET, FILM COATED ORAL
Qty: 9 TABLET | Refills: 3 | Status: SHIPPED | OUTPATIENT
Start: 2024-11-15 | End: 2024-11-15

## 2024-11-15 RX ORDER — BISMUTH SUBSALICYLATE 262MG/15ML
1 SUSPENSION, ORAL (FINAL DOSE FORM) ORAL 2 TIMES DAILY
Qty: 100 EACH | Refills: 5 | Status: SHIPPED | OUTPATIENT
Start: 2024-11-15

## 2024-11-15 RX ORDER — METFORMIN HYDROCHLORIDE 500 MG/1
500 TABLET, EXTENDED RELEASE ORAL
Qty: 90 TABLET | Refills: 2 | Status: SHIPPED | OUTPATIENT
Start: 2024-11-15 | End: 2024-11-18

## 2024-11-15 NOTE — ASSESSMENT & PLAN NOTE
Discussed the need to start a statin if patient has persistently elevated LDL level on recheck today, patient was understanding.    Discussed the importance of healthy diet, nutrition, and lifestyle. Recommend low salt, fat/cholesterol diet and avoid concentrated sweets. Encouraged DASH diet along with fresh fruits & vegetables and low fat dairy products. Counseled patient to exercise/walk as tolerated. Avoid tobacco and alcohol use.

## 2024-11-15 NOTE — ASSESSMENT & PLAN NOTE
Diabetes is  we will recheck A1c today .   Continue current treatment regimen.  Reminded to bring in blood sugar diary at next visit.  Recommended an ADA diet.  Recommended a Mediterranean style of eating  Regular aerobic exercise.  Discussed ways to avoid symptomatic hypoglycemia.  Discussed sick day management.  Discussed foot care.  Reminded to get yearly retinal exam.  Diabetes will be reassessed in 3 months  Discussed the importance of healthy diet, nutrition, and lifestyle. Recommend low salt, fat/cholesterol diet and avoid concentrated sweets. Encouraged DASH diet along with fresh fruits & vegetables and low fat dairy products. Counseled patient to exercise/walk as tolerated. Avoid tobacco and alcohol use.    Discussed diabetes management and need for adequate BS control. Educated patient high A1c puts him/her at risk for MI, CVA, CKD, gastroparesis, foot ulcers, and frequent infections.   Pt informed of Rx for glucometer/test strips, and lancets as needed and explained to keep a blood sugar log. Return to office as instructed. Additionally diet compliance explained - avoid sugar candy, soda, high carbohydrate loads. Explained how losing weight can improve your health and achieve better blood sugar control. Being active can also help prevent or control the disorder. Recommended annual opthalmology follow -up due to diagnosis of diabetes. Annual Podiatry visit prn.
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
## 2024-11-16 LAB
ALBUMIN SERPL-MCNC: 5.1 G/DL (ref 4.3–5.2)
ALBUMIN/CREAT UR: 3 MG/G CREAT (ref 0–29)
ALP SERPL-CCNC: 78 IU/L (ref 44–121)
ALT SERPL-CCNC: 20 IU/L (ref 0–44)
AST SERPL-CCNC: 20 IU/L (ref 0–40)
BILIRUB SERPL-MCNC: 0.4 MG/DL (ref 0–1.2)
BUN SERPL-MCNC: 14 MG/DL (ref 6–20)
BUN/CREAT SERPL: 15 (ref 9–20)
CALCIUM SERPL-MCNC: 9.6 MG/DL (ref 8.7–10.2)
CHLORIDE SERPL-SCNC: 102 MMOL/L (ref 96–106)
CHOLEST SERPL-MCNC: 200 MG/DL (ref 100–199)
CO2 SERPL-SCNC: 26 MMOL/L (ref 20–29)
CREAT SERPL-MCNC: 0.96 MG/DL (ref 0.76–1.27)
CREAT UR-MCNC: 256.9 MG/DL
EGFRCR SERPLBLD CKD-EPI 2021: 109 ML/MIN/1.73
GLOBULIN SER CALC-MCNC: 2 G/DL (ref 1.5–4.5)
GLUCOSE SERPL-MCNC: 137 MG/DL (ref 70–99)
HBA1C MFR BLD: 7.5 % (ref 4.8–5.6)
HDLC SERPL-MCNC: 34 MG/DL
LDLC SERPL CALC-MCNC: 140 MG/DL (ref 0–99)
MICROALBUMIN UR-MCNC: 8.5 UG/ML
POTASSIUM SERPL-SCNC: 3.8 MMOL/L (ref 3.5–5.2)
PROT SERPL-MCNC: 7.1 G/DL (ref 6–8.5)
SODIUM SERPL-SCNC: 142 MMOL/L (ref 134–144)
TRIGL SERPL-MCNC: 144 MG/DL (ref 0–149)
VLDLC SERPL CALC-MCNC: 26 MG/DL (ref 5–40)

## 2024-11-18 PROBLEM — E78.2 MIXED HYPERLIPIDEMIA: Status: ACTIVE | Noted: 2024-11-18

## 2024-11-18 RX ORDER — ATORVASTATIN CALCIUM 10 MG/1
10 TABLET, FILM COATED ORAL NIGHTLY
Qty: 90 TABLET | Refills: 2 | Status: SHIPPED | OUTPATIENT
Start: 2024-11-18

## 2024-11-18 RX ORDER — METFORMIN HYDROCHLORIDE 500 MG/1
1000 TABLET, EXTENDED RELEASE ORAL
Qty: 180 TABLET | Refills: 2 | Status: SHIPPED | OUTPATIENT
Start: 2024-11-18

## 2025-03-28 ENCOUNTER — OFFICE VISIT (OUTPATIENT)
Dept: FAMILY MEDICINE CLINIC | Facility: CLINIC | Age: 30
End: 2025-03-28
Payer: COMMERCIAL

## 2025-03-28 VITALS
BODY MASS INDEX: 30.81 KG/M2 | TEMPERATURE: 98.2 F | HEIGHT: 69 IN | WEIGHT: 208 LBS | OXYGEN SATURATION: 97 % | RESPIRATION RATE: 16 BRPM | SYSTOLIC BLOOD PRESSURE: 112 MMHG | HEART RATE: 79 BPM | DIASTOLIC BLOOD PRESSURE: 70 MMHG

## 2025-03-28 DIAGNOSIS — E11.9 TYPE 2 DIABETES MELLITUS WITHOUT COMPLICATION, WITH LONG-TERM CURRENT USE OF INSULIN: Chronic | ICD-10-CM

## 2025-03-28 DIAGNOSIS — E78.2 MIXED HYPERLIPIDEMIA: Chronic | ICD-10-CM

## 2025-03-28 DIAGNOSIS — Z79.4 TYPE 2 DIABETES MELLITUS WITHOUT COMPLICATION, WITH LONG-TERM CURRENT USE OF INSULIN: Chronic | ICD-10-CM

## 2025-03-28 PROBLEM — Z13.220 LIPID SCREENING: Status: RESOLVED | Noted: 2024-11-15 | Resolved: 2025-03-28

## 2025-03-28 PROBLEM — S99.921S FOOT TRAUMA, RIGHT, SEQUELA: Status: RESOLVED | Noted: 2024-11-15 | Resolved: 2025-03-28

## 2025-03-28 PROBLEM — M79.89 SWELLING OF RIGHT FOOT: Status: RESOLVED | Noted: 2024-11-15 | Resolved: 2025-03-28

## 2025-03-28 LAB — HBA1C MFR BLD: 6 % (ref 4.8–5.6)

## 2025-03-28 RX ORDER — ATORVASTATIN CALCIUM 10 MG/1
10 TABLET, FILM COATED ORAL NIGHTLY
Qty: 90 TABLET | Refills: 2 | Status: SHIPPED | OUTPATIENT
Start: 2025-03-28

## 2025-03-28 RX ORDER — BISMUTH SUBSALICYLATE 262MG/15ML
1 SUSPENSION, ORAL (FINAL DOSE FORM) ORAL 2 TIMES DAILY
Qty: 100 EACH | Refills: 5 | Status: SHIPPED | OUTPATIENT
Start: 2025-03-28

## 2025-03-28 RX ORDER — METFORMIN HYDROCHLORIDE 500 MG/1
1000 TABLET, EXTENDED RELEASE ORAL
Qty: 180 TABLET | Refills: 2 | Status: SHIPPED | OUTPATIENT
Start: 2025-03-28

## 2025-03-28 NOTE — ASSESSMENT & PLAN NOTE
Lipid abnormalities are stable    Plan:  Continue same medication/s without change.      Discussed medication dosage, use, side effects, and goals of treatment in detail.    Counseled patient on lifestyle modifications to help control hyperlipidemia.     Patient Treatment Goals:   LDL goal is under 100    Followup in 6 months.    Discussed the importance of healthy diet, nutrition, and lifestyle. Recommend low salt, fat/cholesterol diet and avoid concentrated sweets. Encouraged DASH diet along with fresh fruits & vegetables and low fat dairy products. Counseled patient to exercise/walk as tolerated. Avoid tobacco and alcohol use.

## 2025-03-28 NOTE — PROGRESS NOTES
"Chief Complaint  Hyperlipidemia and Diabetes    Renetta Currie presents to [facility]  History of Present Illness  29yo male with hx of DM2, HLD here for chronic dz mgmt visit.    Pt reports doing well.    Instructed patient to get the adult preventive immunization that are due at  the pharmacy, including - HBV, Pneumonia, Tdap .    Diabetes  He has type 2 diabetes mellitus. No MedicAlert identification noted. The initial diagnosis of diabetes was made 2 years ago. Pertinent negatives for hypoglycemia include no confusion, headaches, speech difficulty, sweats or tremors. Pertinent negatives for diabetes include no blurred vision, no foot paresthesias, no foot ulcerations, no polydipsia, no polyuria and no weight loss. Symptoms are stable. He is compliant with treatment all of the time. He is following a generally healthy and high fat/cholesterol diet. Meal planning includes carbohydrate counting, avoidance of concentrated sweets, commercial weight loss plan and low carbohydrate diet. He participates in exercise five times a week. He sees a podiatrist. Eye exam is current.       Objective   Vital Signs:  /70 (BP Location: Left arm, Patient Position: Sitting, Cuff Size: Adult)   Pulse 79   Temp 98.2 °F (36.8 °C) (Temporal)   Resp 16   Ht 175.3 cm (69.02\")   Wt 94.3 kg (208 lb)   SpO2 97%   BMI 30.70 kg/m²   Estimated body mass index is 30.7 kg/m² as calculated from the following:    Height as of this encounter: 175.3 cm (69.02\").    Weight as of this encounter: 94.3 kg (208 lb).               Physical Exam  Constitutional:       Appearance: Normal appearance.   HENT:      Head: Normocephalic and atraumatic.   Eyes:      Conjunctiva/sclera: Conjunctivae normal.   Cardiovascular:      Rate and Rhythm: Normal rate and regular rhythm.      Heart sounds: Normal heart sounds.   Pulmonary:      Effort: Pulmonary effort is normal.      Breath sounds: Normal " breath sounds.   Abdominal:      General: Bowel sounds are normal.      Palpations: Abdomen is soft.      Comments: Non-tender   Skin:     General: Skin is warm.   Neurological:      General: No focal deficit present.      Mental Status: He is alert and oriented to person, place, and time.   Psychiatric:         Mood and Affect: Mood normal.         Behavior: Behavior normal.        Result Review :    The following data was reviewed by: MARCY Goodwin on 03/28/2025:  CMP          11/15/2024    09:59   CMP   Glucose 137    BUN 14    Creatinine 0.96    EGFR 109    Sodium 142    Potassium 3.8    Chloride 102    Calcium 9.6    Total Protein 7.1    Albumin 5.1    Globulin 2.0    Total Bilirubin 0.4    Alkaline Phosphatase 78    AST (SGOT) 20    ALT (SGPT) 20    BUN/Creatinine Ratio 15        Lipid Panel          11/15/2024    09:59   Lipid Panel   Total Cholesterol 200    Triglycerides 144    HDL Cholesterol 34    VLDL Cholesterol 26    LDL Cholesterol  140        A1C Last 3 Results          5/13/2024    09:51 11/15/2024    09:59   HGBA1C Last 3 Results   Hemoglobin A1C 6.10  7.5      Microalbumin          5/13/2024    09:51 11/15/2024    09:59   Microalbumin   Microalbumin, Urine 7.1  8.5                     Assessment and Plan     Diagnoses and all orders for this visit:    1. Type 2 diabetes mellitus without complication, with long-term current use of insulin  Assessment & Plan:  Diabetes is stable.   Continue current treatment regimen.  Reminded to bring in blood sugar diary at next visit.  Recommended an ADA diet.  Recommended a Mediterranean style of eating  Regular aerobic exercise.  Discussed ways to avoid symptomatic hypoglycemia.  Discussed sick day management.  Discussed foot care.  Reminded to get yearly retinal exam.  Diabetes will be reassessed in 3 months    Discussed diabetes management and need for adequate BS control. Educated patient high A1c puts him/her at risk for MI, CVA, CKD,  gastroparesis, foot ulcers, and frequent infections.   Pt informed of Rx for glucometer/test strips, and lancets as needed and explained to keep a blood sugar log. Return to office as instructed. Additionally diet compliance explained - avoid sugar candy, soda, high carbohydrate loads. Explained how losing weight can improve your health and achieve better blood sugar control. Being active can also help prevent or control the disorder. Recommended annual opthalmology follow -up due to diagnosis of diabetes. Annual Podiatry visit prn.      Orders:  -     metFORMIN ER (GLUCOPHAGE-XR) 500 MG 24 hr tablet; Take 2 tablets by mouth Daily With Breakfast.  Dispense: 180 tablet; Refill: 2  -     Lancets Ultra Thin 30G misc; Inject 1 each under the skin into the appropriate area as directed 2 (Two) Times a Day.  Dispense: 100 each; Refill: 5  -     glucose blood test strip; Twice Daily  Dispense: 100 each; Refill: 5  -     Hemoglobin A1c  -     Microalbumin / Creatinine Urine Ratio - Urine, Clean Catch    2. Mixed hyperlipidemia  Assessment & Plan:   Lipid abnormalities are stable    Plan:  Continue same medication/s without change.      Discussed medication dosage, use, side effects, and goals of treatment in detail.    Counseled patient on lifestyle modifications to help control hyperlipidemia.     Patient Treatment Goals:   LDL goal is under 100    Followup in 6 months.    Discussed the importance of healthy diet, nutrition, and lifestyle. Recommend low salt, fat/cholesterol diet and avoid concentrated sweets. Encouraged DASH diet along with fresh fruits & vegetables and low fat dairy products. Counseled patient to exercise/walk as tolerated. Avoid tobacco and alcohol use.      Orders:  -     atorvastatin (LIPITOR) 10 MG tablet; Take 1 tablet by mouth Every Night.  Dispense: 90 tablet; Refill: 2      All chronic conditions have been addressed and treated by the practice or other specialists. Medications have been reconciled  and refilled as appropriate. Reiterated compliance and timely follow up appointments. Side effects of all new and old medications reviewed with the patient and patient willing to accept all risks involved. Advised RTO if no improvement or worsening of symptoms or if any new complaints arise. Patient advised to follow up with clinic or call after diagnostic tests, if patient does not hear from office 3 days after the test completion.            Follow Up     Return in about 3 months (around 6/28/2025) for Next scheduled follow up.  Patient was given instructions and counseling regarding his condition or for health maintenance advice. Please see specific information pulled into the AVS if appropriate.

## 2025-03-29 LAB
ALBUMIN/CREAT UR: 2 MG/G CREAT (ref 0–29)
CREAT UR-MCNC: 168.2 MG/DL
MICROALBUMIN UR-MCNC: 4 UG/ML

## 2025-07-28 DIAGNOSIS — Z79.4 TYPE 2 DIABETES MELLITUS WITHOUT COMPLICATION, WITH LONG-TERM CURRENT USE OF INSULIN: Chronic | ICD-10-CM

## 2025-07-28 DIAGNOSIS — E78.2 MIXED HYPERLIPIDEMIA: Chronic | ICD-10-CM

## 2025-07-28 DIAGNOSIS — E11.9 TYPE 2 DIABETES MELLITUS WITHOUT COMPLICATION, WITH LONG-TERM CURRENT USE OF INSULIN: Chronic | ICD-10-CM

## 2025-07-28 RX ORDER — ATORVASTATIN CALCIUM 10 MG/1
10 TABLET, FILM COATED ORAL NIGHTLY
Qty: 90 TABLET | Refills: 2 | Status: SHIPPED | OUTPATIENT
Start: 2025-07-28

## 2025-07-28 RX ORDER — METFORMIN HYDROCHLORIDE 500 MG/1
1000 TABLET, EXTENDED RELEASE ORAL
Qty: 180 TABLET | Refills: 2 | Status: SHIPPED | OUTPATIENT
Start: 2025-07-28